# Patient Record
Sex: MALE | Race: WHITE | NOT HISPANIC OR LATINO | Employment: FULL TIME | URBAN - METROPOLITAN AREA
[De-identification: names, ages, dates, MRNs, and addresses within clinical notes are randomized per-mention and may not be internally consistent; named-entity substitution may affect disease eponyms.]

---

## 2018-07-02 ENCOUNTER — OFFICE VISIT (OUTPATIENT)
Dept: CARDIOLOGY | Facility: CLINIC | Age: 63
End: 2018-07-02
Payer: COMMERCIAL

## 2018-07-02 ENCOUNTER — CLINICAL SUPPORT (OUTPATIENT)
Dept: CARDIOLOGY | Facility: CLINIC | Age: 63
End: 2018-07-02
Payer: COMMERCIAL

## 2018-07-02 VITALS
WEIGHT: 218 LBS | HEART RATE: 72 BPM | SYSTOLIC BLOOD PRESSURE: 140 MMHG | BODY MASS INDEX: 34.21 KG/M2 | DIASTOLIC BLOOD PRESSURE: 86 MMHG | HEIGHT: 67 IN

## 2018-07-02 DIAGNOSIS — I25.10 CORONARY ARTERY DISEASE, ANGINA PRESENCE UNSPECIFIED, UNSPECIFIED VESSEL OR LESION TYPE, UNSPECIFIED WHETHER NATIVE OR TRANSPLANTED HEART: ICD-10-CM

## 2018-07-02 DIAGNOSIS — E78.5 HYPERLIPIDEMIA, UNSPECIFIED HYPERLIPIDEMIA TYPE: ICD-10-CM

## 2018-07-02 DIAGNOSIS — Z76.89 ENCOUNTER TO ESTABLISH CARE: Primary | ICD-10-CM

## 2018-07-02 DIAGNOSIS — R94.39 ABNORMAL STRESS TEST: Primary | ICD-10-CM

## 2018-07-02 DIAGNOSIS — Z91.89 AT RISK FOR CORONARY ARTERY DISEASE: ICD-10-CM

## 2018-07-02 DIAGNOSIS — I10 HYPERTENSION, UNSPECIFIED TYPE: ICD-10-CM

## 2018-07-02 DIAGNOSIS — Z76.89 ENCOUNTER TO ESTABLISH CARE: ICD-10-CM

## 2018-07-02 PROCEDURE — 99205 OFFICE O/P NEW HI 60 MIN: CPT | Mod: S$GLB,,, | Performed by: INTERNAL MEDICINE

## 2018-07-02 PROCEDURE — 3008F BODY MASS INDEX DOCD: CPT | Mod: CPTII,S$GLB,, | Performed by: INTERNAL MEDICINE

## 2018-07-02 PROCEDURE — 99999 PR PBB SHADOW E&M-NEW PATIENT-LVL III: CPT | Mod: PBBFAC,,, | Performed by: INTERNAL MEDICINE

## 2018-07-02 RX ORDER — ASPIRIN 81 MG/1
81 TABLET ORAL DAILY
COMMUNITY

## 2018-07-02 RX ORDER — ROSUVASTATIN CALCIUM 20 MG/1
20 TABLET, COATED ORAL NIGHTLY
COMMUNITY
End: 2020-06-03 | Stop reason: SDUPTHER

## 2018-07-02 RX ORDER — METOPROLOL SUCCINATE 25 MG/1
25 TABLET, EXTENDED RELEASE ORAL DAILY
Qty: 30 TABLET | Refills: 11 | Status: SHIPPED | OUTPATIENT
Start: 2018-07-02 | End: 2018-07-31 | Stop reason: ALTCHOICE

## 2018-07-02 RX ORDER — ERGOCALCIFEROL (VITAMIN D2) 200 MCG/ML
DROPS ORAL DAILY
COMMUNITY
End: 2021-06-10

## 2018-07-02 RX ORDER — RAMIPRIL 10 MG/1
10 CAPSULE ORAL DAILY
COMMUNITY
End: 2018-09-19 | Stop reason: DRUGHIGH

## 2018-07-02 NOTE — LETTER
July 2, 2018      Marin Mayes MD  [id number] [place] [address] [address] [place] [address]           [facility]  [address] [place] [address]  Phone: [phone]  Fax: [phone]          Patient: Jose Juan Magana   MR Number: [id number]   YOB: 1955   Date of Visit: 7/2/2018       Dear Dr. Marin Mayes:    Thank you for referring Jose Juan Magana to me for evaluation. Attached you will find relevant portions of my assessment and plan of care.    If you have questions, please do not hesitate to call me. I look forward to following Jose Juan Magana along with you.    Sincerely,    Raymond Yanes MD    Enclosure  CC:  No Recipients    If you would like to receive this communication electronically, please contact [email or web address] or [phone] to request more information on [facility] access.    For providers and/or their staff who would like to refer a patient to [facility], please contact us through our one-stop-shop provider referral line, [facility], at [phone].    If you feel you have received this communication in error or would no longer like to receive these types of communications, please e-mail [email or web address]

## 2018-07-02 NOTE — PROGRESS NOTES
Subjective:   Patient ID:  Jose Juan Magana is a 62 y.o. male who presents for evaluation of Coronary Artery Disease      HPI  A 61 yo male with h/o cad s/p lhc for abnormal ett w/o any symptoms had 3 mm migue depression with exercise 12 minutes nancy protocol. The lhc showed he has 80% lad disease 90% diagonal lcx om 90% and rca non obs disease. He has  Normal lvf. He has been on medical therapy. He is very active physically walks exercises lifts weight and has no anginal symptoms. He does elliptical  Walks  regularily hikes  No symptoms.he is on statins on asa  . He has no b blockers.  Past Medical History:   Diagnosis Date    Coronary artery disease     Heart murmur     Hyperlipidemia     Hypertension        Past Surgical History:   Procedure Laterality Date    CARDIAC CATHETERIZATION         Social History   Substance Use Topics    Smoking status: Never Smoker    Smokeless tobacco: Never Used    Alcohol use Yes      Comment: occasional       Family History   Problem Relation Age of Onset    Heart attack Brother 59    Heart attack Maternal Grandfather 65    Heart attack Paternal Grandfather 45    Hypertension Brother     Stroke Neg Hx        Current Outpatient Prescriptions   Medication Sig    aspirin (ECOTRIN) 81 MG EC tablet Take 81 mg by mouth once daily.    ergocalciferol (DRISDOL) 8,000 unit/mL Drop Take by mouth once daily.    ramipril (ALTACE) 10 MG capsule Take 10 mg by mouth once daily.    rosuvastatin (CRESTOR) 20 MG tablet Take 20 mg by mouth every evening.     No current facility-administered medications for this visit.      No current outpatient prescriptions on file prior to visit.     No current facility-administered medications on file prior to visit.        Review of patient's allergies indicates:  No Known Allergies    Review of Systems   Constitution: Negative for weakness and malaise/fatigue.   Eyes: Negative for blurred vision.   Cardiovascular: Negative for chest pain,  claudication, cyanosis, dyspnea on exertion, irregular heartbeat, leg swelling, near-syncope, orthopnea, palpitations and paroxysmal nocturnal dyspnea.   Respiratory: Negative for cough, hemoptysis and shortness of breath.    Hematologic/Lymphatic: Negative for bleeding problem. Does not bruise/bleed easily.   Skin: Negative for dry skin and itching.   Musculoskeletal: Negative for falls, muscle weakness and myalgias.   Gastrointestinal: Negative for abdominal pain, diarrhea, heartburn, hematemesis, hematochezia and melena.   Genitourinary: Negative for flank pain and hematuria.   Neurological: Negative for dizziness, focal weakness, headaches, light-headedness, numbness, paresthesias and seizures.   Psychiatric/Behavioral: Negative for altered mental status and memory loss. The patient is not nervous/anxious.    Allergic/Immunologic: Negative for hives.       Objective:   Physical Exam   Constitutional: He is oriented to person, place, and time. He appears well-developed and well-nourished. No distress.   HENT:   Head: Normocephalic and atraumatic.   Eyes: EOM are normal. Pupils are equal, round, and reactive to light. Right eye exhibits no discharge. Left eye exhibits no discharge.   Neck: Neck supple. No JVD present. No thyromegaly present.   Cardiovascular: Normal rate, regular rhythm, normal heart sounds and intact distal pulses.  Exam reveals no gallop and no friction rub.    No murmur heard.  Pulses:       Carotid pulses are 2+ on the right side, and 2+ on the left side.       Radial pulses are 2+ on the right side, and 2+ on the left side.        Femoral pulses are 2+ on the right side, and 2+ on the left side.       Popliteal pulses are 2+ on the right side, and 2+ on the left side.        Dorsalis pedis pulses are 2+ on the right side, and 2+ on the left side.        Posterior tibial pulses are 2+ on the right side, and 2+ on the left side.   Pulmonary/Chest: Effort normal and breath sounds normal. No  "respiratory distress. He has no wheezes. He has no rales. He exhibits no tenderness.   Abdominal: Soft. Bowel sounds are normal. He exhibits no distension. There is no tenderness.   Obese abdomen   Musculoskeletal: Normal range of motion. He exhibits no edema.   Neurological: He is alert and oriented to person, place, and time. No cranial nerve deficit.   Skin: Skin is warm and dry. No rash noted. He is not diaphoretic. No erythema.   Psychiatric: He has a normal mood and affect. His behavior is normal.   Nursing note and vitals reviewed.    Vitals:    07/02/18 1311   BP: (!) 140/86   Pulse: 72   Weight: 98.9 kg (218 lb)   Height: 5' 7" (1.702 m)     No results found for: CHOL  No results found for: HDL  No results found for: LDLCALC  No results found for: TRIG  No results found for: CHOLHDL    Chemistry    No results found for: NA, K, CL, CO2, BUN, CREATININE, GLU No results found for: CALCIUM, ALKPHOS, AST, ALT, BILITOT, ESTGFRAFRICA, EGFRNONAA       No results found for: TSH  No results found for: INR, PROTIME  No results found for: WBC, HGB, HCT, MCV, PLT  BNP  @LABRCNTIP(BNP,BNPTRIAGEBLO)@  CrCl cannot be calculated (No order found.).  Assessment:     1. Abnormal stress test    2. Coronary artery disease, angina presence unspecified, unspecified vessel or lesion type, unspecified whether native or transplanted heart    3. Hyperlipidemia, unspecified hyperlipidemia type    4. Hypertension, unspecified type    I have reviewed all the studies he ahd done at [facility] the interview he ahd with DR DONALDSON as weLL as some of the limited images from his heart cath eh ahd on his phone. The patient basically has mid AND distal lad disease a SIGNIFICANT STENOSIS OF A DIAGONAL THAT IS LARGE AND BIFURCATING IN ADDITION TO A SIGNIFICANT STENOSIS OF A SMALL OM  TO MEDIUM SIZE. HIS RCA IS LARGE AND DOMINANT WITH NON OBS CAD AND HAS NORMAL LVF. HE DOES NOT HAVE A SURGICAL DISEASE. HIS LONGEVITY WILL NOT BE AFFECTED WITH ANY " SURGICAL OR PERCUTANEOUS INTERVENTION DUE TO LACK OF SIGNIFICANT LMCA AND PROXIMAL LAD DISEASE. HE NEEDS GOOD MEDICAL THERAPY WITH B BLOCKERS ACE INHIBITORS STATINS AND ASPIRIN AND ACCORDINGLY PERFORM A STRESS CARDIOLITE ON MEDICAL THERAPY TO ASSESS ISCHEMIC BURDEN THEN MAKE DECISION IF ANY FOCUSED LESION SPECIFIC REVASCULARIZATION IS NEEDED IN ABSENSE OF SYMPTOMS.   Plan:   ADD TOPROL XL 25 MG PO DAILY   REVIEW ACTUAL ANGIOS    STRESS CARDIOLITE WITH MEDICAL THERAPY ON BOARD TO ASSESS ISCHEMIC SYMPTOMS.   STATINS   ASA EC 81 MG PO DAILY.  F/U AFTER CARDIOLITE WAS DONE.,

## 2018-07-17 ENCOUNTER — TELEPHONE (OUTPATIENT)
Dept: CARDIOLOGY | Facility: CLINIC | Age: 63
End: 2018-07-17

## 2018-07-17 DIAGNOSIS — I25.10 CORONARY ARTERY DISEASE INVOLVING NATIVE CORONARY ARTERY OF NATIVE HEART, ANGINA PRESENCE UNSPECIFIED: Primary | ICD-10-CM

## 2018-07-17 DIAGNOSIS — I25.10 CAD (CORONARY ARTERY DISEASE): Primary | ICD-10-CM

## 2018-07-17 DIAGNOSIS — I25.10 CORONARY ARTERY DISEASE, ANGINA PRESENCE UNSPECIFIED, UNSPECIFIED VESSEL OR LESION TYPE, UNSPECIFIED WHETHER NATIVE OR TRANSPLANTED HEART: Primary | ICD-10-CM

## 2018-07-17 NOTE — TELEPHONE ENCOUNTER
Pt is to have a Nuclear Stress test tomorrow morning. There are no EKG's in the system for this pt. Please place an order for an EKG. I will have it scheduled before we begin the Nuclear Stress test in the morning.     Thank you.

## 2018-07-18 ENCOUNTER — HOSPITAL ENCOUNTER (OUTPATIENT)
Dept: RADIOLOGY | Facility: HOSPITAL | Age: 63
Discharge: HOME | End: 2018-07-18
Attending: INTERNAL MEDICINE
Payer: COMMERCIAL

## 2018-07-18 ENCOUNTER — CLINICAL SUPPORT (OUTPATIENT)
Dept: CARDIOLOGY | Facility: CLINIC | Age: 63
End: 2018-07-18
Payer: COMMERCIAL

## 2018-07-18 ENCOUNTER — CLINICAL SUPPORT (OUTPATIENT)
Dept: CARDIOLOGY | Facility: CLINIC | Age: 63
End: 2018-07-18
Attending: INTERNAL MEDICINE
Payer: COMMERCIAL

## 2018-07-18 DIAGNOSIS — I25.10 CORONARY ARTERY DISEASE, ANGINA PRESENCE UNSPECIFIED, UNSPECIFIED VESSEL OR LESION TYPE, UNSPECIFIED WHETHER NATIVE OR TRANSPLANTED HEART: ICD-10-CM

## 2018-07-18 DIAGNOSIS — I25.10 CAD (CORONARY ARTERY DISEASE): ICD-10-CM

## 2018-07-18 DIAGNOSIS — Z91.89 AT RISK FOR CORONARY ARTERY DISEASE: ICD-10-CM

## 2018-07-18 DIAGNOSIS — R94.39 ABNORMAL STRESS TEST: ICD-10-CM

## 2018-07-18 LAB — DIASTOLIC DYSFUNCTION: NO

## 2018-07-18 PROCEDURE — 78452 HT MUSCLE IMAGE SPECT MULT: CPT | Mod: 26,,, | Performed by: INTERNAL MEDICINE

## 2018-07-18 PROCEDURE — 78452 HT MUSCLE IMAGE SPECT MULT: CPT | Mod: TC,PO

## 2018-07-18 PROCEDURE — 93000 ELECTROCARDIOGRAM COMPLETE: CPT | Mod: S$GLB,,, | Performed by: INTERNAL MEDICINE

## 2018-07-18 PROCEDURE — 93015 CV STRESS TEST SUPVJ I&R: CPT | Mod: S$GLB,,, | Performed by: INTERNAL MEDICINE

## 2018-07-25 ENCOUNTER — OFFICE VISIT (OUTPATIENT)
Dept: CARDIOLOGY | Facility: CLINIC | Age: 63
End: 2018-07-25
Payer: COMMERCIAL

## 2018-07-25 VITALS
SYSTOLIC BLOOD PRESSURE: 106 MMHG | WEIGHT: 211.63 LBS | HEART RATE: 60 BPM | BODY MASS INDEX: 33.21 KG/M2 | HEIGHT: 67 IN | DIASTOLIC BLOOD PRESSURE: 62 MMHG

## 2018-07-25 DIAGNOSIS — I25.10 CORONARY ARTERY DISEASE, ANGINA PRESENCE UNSPECIFIED, UNSPECIFIED VESSEL OR LESION TYPE, UNSPECIFIED WHETHER NATIVE OR TRANSPLANTED HEART: Primary | ICD-10-CM

## 2018-07-25 DIAGNOSIS — I10 HYPERTENSION, UNSPECIFIED TYPE: ICD-10-CM

## 2018-07-25 DIAGNOSIS — E78.5 HYPERLIPIDEMIA, UNSPECIFIED HYPERLIPIDEMIA TYPE: ICD-10-CM

## 2018-07-25 DIAGNOSIS — R94.39 ABNORMAL STRESS TEST: ICD-10-CM

## 2018-07-25 PROCEDURE — 99999 PR PBB SHADOW E&M-EST. PATIENT-LVL III: CPT | Mod: PBBFAC,,, | Performed by: INTERNAL MEDICINE

## 2018-07-25 PROCEDURE — 99213 OFFICE O/P EST LOW 20 MIN: CPT | Mod: S$GLB,,, | Performed by: INTERNAL MEDICINE

## 2018-07-25 PROCEDURE — 3008F BODY MASS INDEX DOCD: CPT | Mod: CPTII,S$GLB,, | Performed by: INTERNAL MEDICINE

## 2018-07-25 NOTE — PROGRESS NOTES
Subjective:   Patient ID:  Chino Manzano is a 62 y.o. male who presents for follow up of Coronary Artery Disease      HPI  A 61 yo  Male with  cad hlp lad disease diagonal that is reviewed angio from [facility] was placed obn b blockers and hasd cardiolite done that was normal. nop ischemia induced by cardiolite./ no lv dilatation. He has ischemic st changes on ett at  Hr higher than 110bpm.   He ahs no angina shortness of breath he has some ed after taking toprol. He ahs concerns about taht. He understands his cad limitation for exercsie and rate control he will inquire about bystolic for ed. He has been educated about diet weight loss.  Past Medical History:   Diagnosis Date    Coronary artery disease     Heart murmur     Hyperlipidemia     Hypertension        Past Surgical History:   Procedure Laterality Date    CARDIAC CATHETERIZATION         Social History   Substance Use Topics    Smoking status: Never Smoker    Smokeless tobacco: Never Used    Alcohol use Yes      Comment: occasional       Family History   Problem Relation Age of Onset    Heart attack Brother 59    Heart attack Maternal Grandfather 65    Heart attack Paternal Grandfather 45    Hypertension Brother     Stroke Neg Hx        Current Outpatient Prescriptions   Medication Sig    aspirin (ECOTRIN) 81 MG EC tablet Take 81 mg by mouth once daily.    ergocalciferol (DRISDOL) 8,000 unit/mL Drop Take by mouth once daily.    metoprolol succinate (TOPROL-XL) 25 MG 24 hr tablet Take 1 tablet (25 mg total) by mouth once daily.    ramipril (ALTACE) 10 MG capsule Take 10 mg by mouth once daily.    rosuvastatin (CRESTOR) 20 MG tablet Take 20 mg by mouth every evening.     No current facility-administered medications for this visit.      Current Outpatient Prescriptions on File Prior to Visit   Medication Sig    aspirin (ECOTRIN) 81 MG EC tablet Take 81 mg by mouth once daily.    ergocalciferol (DRISDOL) 8,000 unit/mL Drop Take by mouth once  "daily.    metoprolol succinate (TOPROL-XL) 25 MG 24 hr tablet Take 1 tablet (25 mg total) by mouth once daily.    ramipril (ALTACE) 10 MG capsule Take 10 mg by mouth once daily.    rosuvastatin (CRESTOR) 20 MG tablet Take 20 mg by mouth every evening.     No current facility-administered medications on file prior to visit.      Review of patient's allergies indicates:  No Known Allergies  ROS  Unchanged from previous visit.  Objective:   Physical Exam  Vitals:    07/25/18 1426 07/25/18 1428   BP: 106/64 106/62   BP Location: Right arm Left arm   Pulse: 60    Weight: 96 kg (211 lb 10.3 oz)    Height: 5' 7" (1.702 m)      No results found for: CHOL  No results found for: HDL  No results found for: LDLCALC  No results found for: TRIG  No results found for: CHOLHDL    Chemistry    No results found for: NA, K, CL, CO2, BUN, CREATININE, GLU No results found for: CALCIUM, ALKPHOS, AST, ALT, BILITOT, ESTGFRAFRICA, EGFRNONAA       No results found for: TSH  No results found for: INR, PROTIME  No results found for: WBC, HGB, HCT, MCV, PLT  BMP  No results found for: NA, K, CL, CO2, BUN, CREATININE, CALCIUM, ANIONGAP, ESTGFRAFRICA, EGFRNONAA  CrCl cannot be calculated (No order found.).    Assessment:     1. Coronary artery disease, angina presence unspecified, unspecified vessel or lesion type, unspecified whether native or transplanted heart    2. Hyperlipidemia, unspecified hyperlipidemia type    3. Hypertension, unspecified type    4. Abnormal stress test      Has asymptomatic cad he needs medical therapy and statins high dose asa and b blockers and limit exercise to hr 110/min and under.  He also was evaluated by DR BOATENG THAT AGREES WITH CURRENT PLAN,.  Plan:   AS PER ABOVE.  REPEAT STRESS TEST IN 6 MONTHS TO 1 YEAR OR AWAIT SYMPTOMS.  Continue current therapy  Cardiac low salt diet.  Risk factor modification and excercise program./WEIGHT LOSS  F/u in 6 months with lipid cmp       "

## 2018-07-31 ENCOUNTER — TELEPHONE (OUTPATIENT)
Dept: CARDIOLOGY | Facility: CLINIC | Age: 63
End: 2018-07-31

## 2018-07-31 DIAGNOSIS — I10 ESSENTIAL HYPERTENSION: ICD-10-CM

## 2018-07-31 DIAGNOSIS — I25.10 CORONARY ARTERY DISEASE INVOLVING NATIVE CORONARY ARTERY OF NATIVE HEART, ANGINA PRESENCE UNSPECIFIED: Primary | ICD-10-CM

## 2018-07-31 RX ORDER — NEBIVOLOL 5 MG/1
5 TABLET ORAL NIGHTLY
COMMUNITY
End: 2018-07-31 | Stop reason: SDUPTHER

## 2018-07-31 RX ORDER — NEBIVOLOL 5 MG/1
5 TABLET ORAL NIGHTLY
Qty: 90 TABLET | Refills: 3 | Status: SHIPPED | OUTPATIENT
Start: 2018-07-31 | End: 2019-09-14 | Stop reason: SDUPTHER

## 2018-07-31 NOTE — TELEPHONE ENCOUNTER
----- Message from Eneida Galdamez sent at 7/31/2018  8:49 AM CDT -----  Contact: pt  He's calling in regards to Rx being called in to pharm (bystolic)   pls call pt back at [phone] (home)       [facility] - [address] [address]  Phone: [phone] Fax: [phone]

## 2018-07-31 NOTE — TELEPHONE ENCOUNTER
Patient returned call and he stated he wanted to try Bystolic as discussed with Dr. Yanes versus Metoprolol 25 mg daily.  Advised would check with Dr. Yanes    Attempted to reach patient and left voicemail with direct contact number.  Called [facility] and all 3 prescriptions have multiple refills available.

## 2018-09-18 ENCOUNTER — TELEPHONE (OUTPATIENT)
Dept: CARDIOLOGY | Facility: CLINIC | Age: 63
End: 2018-09-18

## 2018-09-18 NOTE — TELEPHONE ENCOUNTER
Returned call and left message for return call on direct call back number to discuss appointment tomorrow

## 2018-09-18 NOTE — TELEPHONE ENCOUNTER
Returned phone call to patient and stated he's down approximately 25 pounds now and is having episodes of lightheadedness, dizziness especially when he lowers his head with golf and would like to know if blood pressure medications may need to be adjusted.  He currently does not have a blood pressure machine to monitor.  Altace 10 mg daily and Bystolic 5 mg    Dr. Yanes- please advise

## 2018-09-18 NOTE — TELEPHONE ENCOUNTER
----- Message from Joselyn Antony sent at 9/18/2018  3:50 PM CDT -----  Contact: wife(Beatrice)[phone]  Would like to consult with nurse regarding patient being dizzy, please call back at [phone]. Thanks/ar

## 2018-09-19 ENCOUNTER — TELEPHONE (OUTPATIENT)
Dept: CARDIOLOGY | Facility: CLINIC | Age: 63
End: 2018-09-19

## 2018-09-19 ENCOUNTER — CLINICAL SUPPORT (OUTPATIENT)
Dept: CARDIOLOGY | Facility: CLINIC | Age: 63
End: 2018-09-19
Payer: COMMERCIAL

## 2018-09-19 DIAGNOSIS — I10 ESSENTIAL HYPERTENSION: Primary | ICD-10-CM

## 2018-09-19 RX ORDER — RAMIPRIL 5 MG/1
5 CAPSULE ORAL DAILY
Qty: 30 CAPSULE | Refills: 6 | Status: SHIPPED | OUTPATIENT
Start: 2018-09-19 | End: 2018-12-07 | Stop reason: SDUPTHER

## 2018-09-19 RX ORDER — RAMIPRIL 5 MG/1
5 CAPSULE ORAL DAILY
COMMUNITY
End: 2018-09-19 | Stop reason: SDUPTHER

## 2018-09-19 NOTE — TELEPHONE ENCOUNTER
Patient here in Cardiology for Blood Pressure check  Sitting 112/64 pulse 62  Standing 90/64 pulse 60  Supine 104/60 pulse 58  Sitting 110/60 pulse 60  Current medications Altace 10 mg along with Aspirin 81 mg in the morning and Bystolic 5 mg with Crestor 20 mg in the evening  Telephoned Dr. Yanes with today's b/p readings and advised patient to decrease Altace to 5 mg daily  Patient advised accordingly and new prescription called to [facility]

## 2018-09-19 NOTE — PROGRESS NOTES
Patient here in Cardiology for Blood Pressure check  Sitting 112/64 pulse 62  Standing 90/64 pulse 60  Supine 104/60 pulse 58  Sitting 110/60 pulse 60  Current medications Altace 10 mg along with Aspirin 81 mg in the morning and Bystolic 5 mg with Crestor 20 mg in the evening  Telephoned Dr. Yanes with today's b/p readings and advised patient to decrease Altace to 5 mg daily  Patient advised accordingly and new prescription called to [facility]

## 2018-09-19 NOTE — TELEPHONE ENCOUNTER
Returned phone call and scheduled Nurse visit for 1PM    ----- Message from Jyothi Johnson sent at 9/19/2018  9:00 AM CDT -----  Contact: Pt  Please give pt a call at ..[phone] (home) he is returning the nurse call regarding a visit he needed to have today.

## 2018-11-13 ENCOUNTER — TELEPHONE (OUTPATIENT)
Dept: CARDIOLOGY | Facility: CLINIC | Age: 63
End: 2018-11-13

## 2018-11-13 NOTE — TELEPHONE ENCOUNTER
"Patient called --states on 11/3/18 his eye became "blood shot red". States went to Ophthamologist on 11/5/18 and was told a blood vessel burst and may be due to taking Aspirin 81 mg daily. Patient states no other signs of bleeding noticed--patient has scheduled appointment with PEDRITO Del Cid on 11/14/18.  "

## 2018-11-14 ENCOUNTER — OFFICE VISIT (OUTPATIENT)
Dept: CARDIOLOGY | Facility: CLINIC | Age: 63
End: 2018-11-14
Payer: COMMERCIAL

## 2018-11-14 ENCOUNTER — CLINICAL SUPPORT (OUTPATIENT)
Dept: CARDIOLOGY | Facility: CLINIC | Age: 63
End: 2018-11-14
Payer: COMMERCIAL

## 2018-11-14 VITALS
WEIGHT: 194 LBS | SYSTOLIC BLOOD PRESSURE: 112 MMHG | DIASTOLIC BLOOD PRESSURE: 76 MMHG | HEIGHT: 67 IN | HEART RATE: 54 BPM | BODY MASS INDEX: 30.45 KG/M2

## 2018-11-14 DIAGNOSIS — I10 ESSENTIAL HYPERTENSION: ICD-10-CM

## 2018-11-14 DIAGNOSIS — I25.10 CORONARY ARTERY DISEASE INVOLVING NATIVE CORONARY ARTERY OF NATIVE HEART WITHOUT ANGINA PECTORIS: ICD-10-CM

## 2018-11-14 DIAGNOSIS — E78.5 HYPERLIPIDEMIA, UNSPECIFIED HYPERLIPIDEMIA TYPE: ICD-10-CM

## 2018-11-14 DIAGNOSIS — R00.1 BRADYCARDIA: Primary | ICD-10-CM

## 2018-11-14 DIAGNOSIS — R00.1 BRADYCARDIA: ICD-10-CM

## 2018-11-14 DIAGNOSIS — R94.39 ABNORMAL STRESS TEST: ICD-10-CM

## 2018-11-14 PROCEDURE — 93000 ELECTROCARDIOGRAM COMPLETE: CPT | Mod: S$GLB,,, | Performed by: NUCLEAR MEDICINE

## 2018-11-14 PROCEDURE — 99999 PR PBB SHADOW E&M-EST. PATIENT-LVL III: CPT | Mod: PBBFAC,,, | Performed by: PHYSICIAN ASSISTANT

## 2018-11-14 PROCEDURE — 3008F BODY MASS INDEX DOCD: CPT | Mod: CPTII,S$GLB,, | Performed by: PHYSICIAN ASSISTANT

## 2018-11-14 PROCEDURE — 99215 OFFICE O/P EST HI 40 MIN: CPT | Mod: S$GLB,,, | Performed by: PHYSICIAN ASSISTANT

## 2018-11-14 NOTE — PROGRESS NOTES
Subjective:    Patient ID:  Chino Manzano is a 62 y.o. male who presents for follow-up of Coronary Artery Disease; Hypertension; and Hyperlipidemia      HPI   Mr. Manzano is a 62 year old male patient with a PMHx of CAD (LAD and diagonal disease) and hyperlipidemia who presents today for follow-up. He returns today and complains of redness in his left eye. States occurred on 11/3/18 after pressure washing his house. Went to ophthalmologist who stated it will take time to resolve. Tried holding ASA on his own x 1 week without much improvement. Cardiac wise, seems stable. No chest pain, SOB, or anginal equivalent. No palpitations. Still complains of positional dizziness and lightheadedness, concerned his BP may be too low. BP excellent today in clinic. Patient reports compliance with his medications. Very active and plays golf multiple times per week.     Review of Systems   Constitution: Negative for chills, decreased appetite, fever, weakness and malaise/fatigue.   HENT: Negative for congestion, hoarse voice and sore throat.         Left eye redness   Eyes: Negative for blurred vision and discharge.   Cardiovascular: Negative for chest pain, claudication, cyanosis, dyspnea on exertion, irregular heartbeat, leg swelling, near-syncope, orthopnea, palpitations and paroxysmal nocturnal dyspnea.   Respiratory: Negative for cough, hemoptysis, shortness of breath, snoring, sputum production and wheezing.    Endocrine: Negative for cold intolerance and heat intolerance.   Hematologic/Lymphatic: Negative for bleeding problem. Does not bruise/bleed easily.   Skin: Negative for rash.   Musculoskeletal: Negative for arthritis, back pain, joint pain, joint swelling, muscle cramps, muscle weakness and myalgias.   Gastrointestinal: Negative for abdominal pain, constipation, diarrhea, heartburn, melena and nausea.   Genitourinary: Negative for hematuria.   Neurological: Positive for dizziness and light-headedness. Negative for  "focal weakness, headaches, loss of balance, numbness, paresthesias and seizures.   Psychiatric/Behavioral: Negative for memory loss. The patient does not have insomnia.    Allergic/Immunologic: Negative for hives.     /76 (BP Location: Left arm, Patient Position: Sitting, BP Method: Medium (Manual))   Pulse (!) 54   Ht 5' 7" (1.702 m)   Wt 88 kg (194 lb 0.1 oz)   BMI 30.39 kg/m²   Repeat /72    Sitting 110/72  Sittin/78  Standing 106/78  Objective:    Physical Exam   Constitutional: He is oriented to person, place, and time. He appears well-developed and well-nourished. No distress.   HENT:   Head: Normocephalic and atraumatic.   Eyes: Pupils are equal, round, and reactive to light. Right eye exhibits no discharge.   Left eye with subconjunctival hemorrhage   Neck: Neck supple. No JVD present. No thyromegaly present.   Cardiovascular: Regular rhythm, S1 normal, S2 normal, normal heart sounds and intact distal pulses. Bradycardia present.   No murmur heard.  Pulmonary/Chest: Effort normal and breath sounds normal. No respiratory distress. He has no wheezes. He has no rales.   Abdominal: Soft. He exhibits no distension. There is no tenderness. There is no rebound.   Musculoskeletal: He exhibits no edema.   Neurological: He is alert and oriented to person, place, and time.   Skin: Skin is warm and dry. He is not diaphoretic. No erythema.   Psychiatric: He has a normal mood and affect. His behavior is normal.   Nursing note and vitals reviewed.      Impression: NORMAL MYOCARDIAL PERFUSION  1. The perfusion scan is free of evidence for myocardial ischemia or injury.   2. Resting wall motion is physiologic.   3. Resting LV function is normal.   4. The ventricular volumes are normal at rest and stress.   5. The extracardiac distribution of radioactivity is normal.   6. There was no previous study available to compare.      Chemistry    No results found for: NA, K, CL, CO2, BUN, CREATININE, GLU No " results found for: CALCIUM, ALKPHOS, AST, ALT, BILITOT, ESTGFRAFRICA, EGFRNONAA     No results found for: CHOL  No results found for: HDL  No results found for: LDLCALC  No results found for: TRIG  No results found for: CHOLHDL            Assessment:       1. Coronary artery disease involving native coronary artery of native heart without angina pectoris    2. Abnormal stress test    3. Hyperlipidemia, unspecified hyperlipidemia type    4. Essential hypertension       Presents for f/u. Unclear cause of his subconjunctival hemorrhage. Seems stable CV wise. No angina or equivalent. Reports positional lightheadedness, such as when bending over. BP stable today. Advised to continue same meds and log BP and bring to f/u with Dr. Yanes in December. Questions regarding his previous stress test results and echo findings discussed in detail.  Plan:   -Keep appt for labs  -Continue same meds, log BP and bring to appt  -Follow-up with ophthalmology as needed  -Keep appt with Dr. Yanes in December    ---This was a detailed, hour long visit

## 2018-11-15 ENCOUNTER — TELEPHONE (OUTPATIENT)
Dept: CARDIOLOGY | Facility: CLINIC | Age: 63
End: 2018-11-15

## 2018-11-15 NOTE — TELEPHONE ENCOUNTER
Please phone patient. Ok to decrease Ramipril to 2.5 mg daily and assess response/see if lightheadedness, dizziness improves.    Log BP and bring to appt with Dr. Yanes in December    IF he needs new prescription, please send to me    Thanks

## 2018-11-15 NOTE — TELEPHONE ENCOUNTER
Called and informed pt of information, pt states he just received refill for 5 mg dosage and will not be able to get new dosage until December, pt asked if he can take Ramipril every other day.    After speaking with PEDRITO Del Cid pt was advise that he can not take Ramipril every other day it must be taken daily, pt was then advise to continue the 5 mg dosage. And in December when he see  if he feel that Ramipril needs to be adjusted then it can be done then. Pt was also told to keep BP log daily until that appointment day and come with the log to show      Pt verbalized understanding

## 2018-12-03 ENCOUNTER — LAB VISIT (OUTPATIENT)
Dept: LAB | Facility: HOSPITAL | Age: 63
End: 2018-12-03
Attending: INTERNAL MEDICINE
Payer: COMMERCIAL

## 2018-12-03 DIAGNOSIS — E78.5 HYPERLIPIDEMIA, UNSPECIFIED HYPERLIPIDEMIA TYPE: ICD-10-CM

## 2018-12-03 LAB
ALBUMIN SERPL BCP-MCNC: 3.8 G/DL
ALP SERPL-CCNC: 71 U/L
ALT SERPL W/O P-5'-P-CCNC: 24 U/L
ANION GAP SERPL CALC-SCNC: 7 MMOL/L
AST SERPL-CCNC: 25 U/L
BILIRUB SERPL-MCNC: 0.7 MG/DL
BUN SERPL-MCNC: 15 MG/DL
CALCIUM SERPL-MCNC: 9.5 MG/DL
CHLORIDE SERPL-SCNC: 105 MMOL/L
CHOLEST SERPL-MCNC: 97 MG/DL
CHOLEST/HDLC SERPL: 2.3 {RATIO}
CO2 SERPL-SCNC: 28 MMOL/L
CREAT SERPL-MCNC: 1 MG/DL
EST. GFR  (AFRICAN AMERICAN): >60 ML/MIN/1.73 M^2
EST. GFR  (NON AFRICAN AMERICAN): >60 ML/MIN/1.73 M^2
GLUCOSE SERPL-MCNC: 85 MG/DL
HDLC SERPL-MCNC: 42 MG/DL
HDLC SERPL: 43.3 %
LDLC SERPL CALC-MCNC: 46 MG/DL
NONHDLC SERPL-MCNC: 55 MG/DL
POTASSIUM SERPL-SCNC: 4.7 MMOL/L
PROT SERPL-MCNC: 6.9 G/DL
SODIUM SERPL-SCNC: 140 MMOL/L
TRIGL SERPL-MCNC: 45 MG/DL

## 2018-12-03 PROCEDURE — 80053 COMPREHEN METABOLIC PANEL: CPT

## 2018-12-03 PROCEDURE — 36415 COLL VENOUS BLD VENIPUNCTURE: CPT | Mod: PO

## 2018-12-03 PROCEDURE — 80061 LIPID PANEL: CPT

## 2018-12-07 ENCOUNTER — OFFICE VISIT (OUTPATIENT)
Dept: CARDIOLOGY | Facility: CLINIC | Age: 63
End: 2018-12-07
Payer: COMMERCIAL

## 2018-12-07 VITALS
DIASTOLIC BLOOD PRESSURE: 68 MMHG | BODY MASS INDEX: 30.56 KG/M2 | SYSTOLIC BLOOD PRESSURE: 102 MMHG | WEIGHT: 194.69 LBS | HEART RATE: 60 BPM | HEIGHT: 67 IN

## 2018-12-07 DIAGNOSIS — E78.5 HYPERLIPIDEMIA, UNSPECIFIED HYPERLIPIDEMIA TYPE: ICD-10-CM

## 2018-12-07 DIAGNOSIS — R94.39 ABNORMAL STRESS TEST: ICD-10-CM

## 2018-12-07 DIAGNOSIS — I25.10 CORONARY ARTERY DISEASE INVOLVING NATIVE CORONARY ARTERY OF NATIVE HEART WITHOUT ANGINA PECTORIS: Primary | ICD-10-CM

## 2018-12-07 DIAGNOSIS — I10 ESSENTIAL HYPERTENSION: ICD-10-CM

## 2018-12-07 PROCEDURE — 99999 PR PBB SHADOW E&M-EST. PATIENT-LVL III: CPT | Mod: PBBFAC,,, | Performed by: INTERNAL MEDICINE

## 2018-12-07 PROCEDURE — 3008F BODY MASS INDEX DOCD: CPT | Mod: CPTII,S$GLB,, | Performed by: INTERNAL MEDICINE

## 2018-12-07 PROCEDURE — 99214 OFFICE O/P EST MOD 30 MIN: CPT | Mod: S$GLB,,, | Performed by: INTERNAL MEDICINE

## 2018-12-07 RX ORDER — RAMIPRIL 2.5 MG/1
2.5 CAPSULE ORAL DAILY
Qty: 30 CAPSULE | Refills: 6 | Status: SHIPPED | OUTPATIENT
Start: 2018-12-07 | End: 2019-07-05 | Stop reason: SDUPTHER

## 2018-12-07 NOTE — PROGRESS NOTES
Subjective:   Patient ID:  Chino Manzano is a 62 y.o. male who presents for follow up of Hypertension (c/o dizziness)      HPI  A 63 yo male with cad asymptomatic with multivessel w/o any ischemia on stress test he has been compliant with diet he lost weight 32 lbs since June. Has no chest pain or shortness of breath he exercises regularily. He gets lightheaded when he bends over.he is not drinking enough water. He is spacing his meds ramipril in am bystolic in pm.  Past Medical History:   Diagnosis Date    Coronary artery disease     Heart murmur     Hyperlipidemia     Hypertension        Past Surgical History:   Procedure Laterality Date    CARDIAC CATHETERIZATION         Social History     Tobacco Use    Smoking status: Never Smoker    Smokeless tobacco: Never Used   Substance Use Topics    Alcohol use: Yes     Comment: occasional    Drug use: No       Family History   Problem Relation Age of Onset    Heart attack Brother 59    Heart attack Maternal Grandfather 65    Heart attack Paternal Grandfather 45    Hypertension Brother     Stroke Neg Hx        Current Outpatient Medications   Medication Sig    aspirin (ECOTRIN) 81 MG EC tablet Take 81 mg by mouth once daily.    ergocalciferol (DRISDOL) 8,000 unit/mL Drop Take by mouth once daily.    nebivolol (BYSTOLIC) 5 MG Tab Take 1 tablet (5 mg total) by mouth nightly.    ramipril (ALTACE) 5 MG capsule Take 1 capsule (5 mg total) by mouth once daily.    rosuvastatin (CRESTOR) 20 MG tablet Take 20 mg by mouth every evening.     No current facility-administered medications for this visit.      Current Outpatient Medications on File Prior to Visit   Medication Sig    aspirin (ECOTRIN) 81 MG EC tablet Take 81 mg by mouth once daily.    ergocalciferol (DRISDOL) 8,000 unit/mL Drop Take by mouth once daily.    nebivolol (BYSTOLIC) 5 MG Tab Take 1 tablet (5 mg total) by mouth nightly.    ramipril (ALTACE) 5 MG capsule Take 1 capsule (5 mg total) by  mouth once daily.    rosuvastatin (CRESTOR) 20 MG tablet Take 20 mg by mouth every evening.     No current facility-administered medications on file prior to visit.      Review of patient's allergies indicates:  No Known Allergies  Review of Systems   Constitution: Negative for weakness and malaise/fatigue.   Eyes: Negative for blurred vision.   Cardiovascular: Negative for chest pain, claudication, cyanosis, dyspnea on exertion, irregular heartbeat, leg swelling, near-syncope, orthopnea, palpitations and paroxysmal nocturnal dyspnea.   Respiratory: Negative for cough, hemoptysis and shortness of breath.    Hematologic/Lymphatic: Negative for bleeding problem. Does not bruise/bleed easily.   Skin: Negative for dry skin and itching.   Musculoskeletal: Negative for falls, muscle weakness and myalgias.   Gastrointestinal: Negative for abdominal pain, diarrhea, heartburn, hematemesis, hematochezia and melena.   Genitourinary: Negative for flank pain and hematuria.   Neurological: Negative for dizziness, focal weakness, headaches, light-headedness, numbness, paresthesias and seizures.   Psychiatric/Behavioral: Negative for altered mental status and memory loss. The patient is not nervous/anxious.    Allergic/Immunologic: Negative for hives.       Objective:   Physical Exam   Constitutional: He is oriented to person, place, and time. He appears well-developed and well-nourished. No distress.   HENT:   Head: Normocephalic and atraumatic.   Eyes: EOM are normal. Pupils are equal, round, and reactive to light. Right eye exhibits no discharge. Left eye exhibits no discharge.   Neck: Neck supple. No JVD present. No thyromegaly present.   Cardiovascular: Normal rate, regular rhythm, normal heart sounds and intact distal pulses. Exam reveals no gallop and no friction rub.   No murmur heard.  Pulmonary/Chest: Effort normal and breath sounds normal. No respiratory distress. He has no wheezes. He has no rales. He exhibits no  "tenderness.   Abdominal: Soft. Bowel sounds are normal. He exhibits no distension. There is no tenderness.   Musculoskeletal: Normal range of motion. He exhibits no edema.   Neurological: He is alert and oriented to person, place, and time. No cranial nerve deficit.   Skin: Skin is warm and dry. No rash noted. He is not diaphoretic. No erythema.   Psychiatric: He has a normal mood and affect. His behavior is normal.   Nursing note and vitals reviewed.    Vitals:    12/07/18 0822   BP: 102/68   Patient Position: Sitting   BP Method: Small (Manual)   Pulse: 60   Weight: 88.3 kg (194 lb 10.7 oz)   Height: 5' 7" (1.702 m)     Lab Results   Component Value Date    CHOL 97 (L) 12/03/2018     Lab Results   Component Value Date    HDL 42 12/03/2018     Lab Results   Component Value Date    LDLCALC 46.0 (L) 12/03/2018     Lab Results   Component Value Date    TRIG 45 12/03/2018     Lab Results   Component Value Date    CHOLHDL 43.3 12/03/2018       Chemistry        Component Value Date/Time     12/03/2018 0900    K 4.7 12/03/2018 0900     12/03/2018 0900    CO2 28 12/03/2018 0900    BUN 15 12/03/2018 0900    CREATININE 1.0 12/03/2018 0900    GLU 85 12/03/2018 0900        Component Value Date/Time    CALCIUM 9.5 12/03/2018 0900    ALKPHOS 71 12/03/2018 0900    AST 25 12/03/2018 0900    ALT 24 12/03/2018 0900    BILITOT 0.7 12/03/2018 0900    ESTGFRAFRICA >60.0 12/03/2018 0900    EGFRNONAA >60.0 12/03/2018 0900          No results found for: TSH  No results found for: INR, PROTIME  No results found for: WBC, HGB, HCT, MCV, PLT  BMP  Sodium   Date Value Ref Range Status   12/03/2018 140 136 - 145 mmol/L Final     Potassium   Date Value Ref Range Status   12/03/2018 4.7 3.5 - 5.1 mmol/L Final     Chloride   Date Value Ref Range Status   12/03/2018 105 95 - 110 mmol/L Final     CO2   Date Value Ref Range Status   12/03/2018 28 23 - 29 mmol/L Final     BUN, Bld   Date Value Ref Range Status   12/03/2018 15 8 - 23 mg/dL " Final     Creatinine   Date Value Ref Range Status   12/03/2018 1.0 0.5 - 1.4 mg/dL Final     Calcium   Date Value Ref Range Status   12/03/2018 9.5 8.7 - 10.5 mg/dL Final     Anion Gap   Date Value Ref Range Status   12/03/2018 7 (L) 8 - 16 mmol/L Final     eGFR if    Date Value Ref Range Status   12/03/2018 >60.0 >60 mL/min/1.73 m^2 Final     eGFR if non    Date Value Ref Range Status   12/03/2018 >60.0 >60 mL/min/1.73 m^2 Final     Comment:     Calculation used to obtain the estimated glomerular filtration  rate (eGFR) is the CKD-EPI equation.        Estimated Creatinine Clearance: 82.3 mL/min (based on SCr of 1 mg/dL).    Assessment:     1. Coronary artery disease involving native coronary artery of native heart without angina pectoris    2. Hyperlipidemia, unspecified hyperlipidemia type    3. Essential hypertension    4. Abnormal stress test      Stable clinically doing well has no angina his lipids are on target. No further cath   Plan:   Continue current therapy  Cardiac low salt diet.  Risk factor modification and excercise program./weight loss  F/u in 6 months with lipid cmp with stress cardiolite

## 2019-05-27 ENCOUNTER — CLINICAL SUPPORT (OUTPATIENT)
Dept: CARDIOLOGY | Facility: CLINIC | Age: 64
End: 2019-05-27
Attending: INTERNAL MEDICINE
Payer: COMMERCIAL

## 2019-05-27 ENCOUNTER — HOSPITAL ENCOUNTER (OUTPATIENT)
Dept: RADIOLOGY | Facility: HOSPITAL | Age: 64
Discharge: HOME | End: 2019-05-27
Attending: INTERNAL MEDICINE
Payer: COMMERCIAL

## 2019-05-27 DIAGNOSIS — I25.10 CORONARY ARTERY DISEASE INVOLVING NATIVE CORONARY ARTERY OF NATIVE HEART WITHOUT ANGINA PECTORIS: ICD-10-CM

## 2019-05-27 DIAGNOSIS — R94.39 ABNORMAL STRESS TEST: ICD-10-CM

## 2019-05-27 LAB — DIASTOLIC DYSFUNCTION: NO

## 2019-05-27 PROCEDURE — 93015 NM MULTI TREAD STRESS CARDIAC COMPONENT: ICD-10-PCS | Mod: S$GLB,,, | Performed by: NUCLEAR MEDICINE

## 2019-05-27 PROCEDURE — 93015 CV STRESS TEST SUPVJ I&R: CPT | Mod: S$GLB,,, | Performed by: NUCLEAR MEDICINE

## 2019-05-27 PROCEDURE — A9502 TC99M TETROFOSMIN: HCPCS

## 2019-05-27 PROCEDURE — 78452 NM MULTI TREAD STRESS CARDIAC COMPONENT: ICD-10-PCS | Mod: 26,,, | Performed by: NUCLEAR MEDICINE

## 2019-05-27 PROCEDURE — 78452 HT MUSCLE IMAGE SPECT MULT: CPT | Mod: 26,,, | Performed by: NUCLEAR MEDICINE

## 2019-05-29 ENCOUNTER — TELEPHONE (OUTPATIENT)
Dept: CARDIOLOGY | Facility: CLINIC | Age: 64
End: 2019-05-29

## 2019-06-06 ENCOUNTER — OFFICE VISIT (OUTPATIENT)
Dept: CARDIOLOGY | Facility: CLINIC | Age: 64
End: 2019-06-06
Payer: COMMERCIAL

## 2019-06-06 VITALS
WEIGHT: 203.38 LBS | HEART RATE: 56 BPM | HEIGHT: 67 IN | SYSTOLIC BLOOD PRESSURE: 110 MMHG | DIASTOLIC BLOOD PRESSURE: 70 MMHG | BODY MASS INDEX: 31.92 KG/M2

## 2019-06-06 DIAGNOSIS — E78.5 HYPERLIPIDEMIA, UNSPECIFIED HYPERLIPIDEMIA TYPE: ICD-10-CM

## 2019-06-06 DIAGNOSIS — I25.10 CORONARY ARTERY DISEASE INVOLVING NATIVE CORONARY ARTERY OF NATIVE HEART WITHOUT ANGINA PECTORIS: Primary | ICD-10-CM

## 2019-06-06 DIAGNOSIS — I10 ESSENTIAL HYPERTENSION: ICD-10-CM

## 2019-06-06 DIAGNOSIS — R94.39 ABNORMAL STRESS TEST: ICD-10-CM

## 2019-06-06 PROCEDURE — 99214 OFFICE O/P EST MOD 30 MIN: CPT | Mod: S$GLB,,, | Performed by: INTERNAL MEDICINE

## 2019-06-06 PROCEDURE — 99214 PR OFFICE/OUTPT VISIT, EST, LEVL IV, 30-39 MIN: ICD-10-PCS | Mod: S$GLB,,, | Performed by: INTERNAL MEDICINE

## 2019-06-06 PROCEDURE — 99999 PR PBB SHADOW E&M-EST. PATIENT-LVL III: ICD-10-PCS | Mod: PBBFAC,,, | Performed by: INTERNAL MEDICINE

## 2019-06-06 PROCEDURE — 3008F PR BODY MASS INDEX (BMI) DOCUMENTED: ICD-10-PCS | Mod: CPTII,S$GLB,, | Performed by: INTERNAL MEDICINE

## 2019-06-06 PROCEDURE — 3074F SYST BP LT 130 MM HG: CPT | Mod: CPTII,S$GLB,, | Performed by: INTERNAL MEDICINE

## 2019-06-06 PROCEDURE — 3074F PR MOST RECENT SYSTOLIC BLOOD PRESSURE < 130 MM HG: ICD-10-PCS | Mod: CPTII,S$GLB,, | Performed by: INTERNAL MEDICINE

## 2019-06-06 PROCEDURE — 3078F PR MOST RECENT DIASTOLIC BLOOD PRESSURE < 80 MM HG: ICD-10-PCS | Mod: CPTII,S$GLB,, | Performed by: INTERNAL MEDICINE

## 2019-06-06 PROCEDURE — 3078F DIAST BP <80 MM HG: CPT | Mod: CPTII,S$GLB,, | Performed by: INTERNAL MEDICINE

## 2019-06-06 PROCEDURE — 99999 PR PBB SHADOW E&M-EST. PATIENT-LVL III: CPT | Mod: PBBFAC,,, | Performed by: INTERNAL MEDICINE

## 2019-06-06 PROCEDURE — 3008F BODY MASS INDEX DOCD: CPT | Mod: CPTII,S$GLB,, | Performed by: INTERNAL MEDICINE

## 2019-06-06 NOTE — PROGRESS NOTES
Subjective:   Patient ID:  Chino Manzano is a 63 y.o. male who presents for follow up of Hypertension (6 mth f/u)      HPI  A 62 yo male with cad asymptomatic very good exercise tolerance negative cardiolite is here for f/u. He is exercising doing well plays [employer] walks he gets dizzy when he leans forward and tries to get up. Ha s no other issues clinically he is compliant with meds. Gained few pounds over the past months.   Past Medical History:   Diagnosis Date    Coronary artery disease     Heart murmur     Hyperlipidemia     Hypertension        Past Surgical History:   Procedure Laterality Date    CARDIAC CATHETERIZATION         Social History     Tobacco Use    Smoking status: Never Smoker    Smokeless tobacco: Never Used   Substance Use Topics    Alcohol use: Yes     Comment: occasional    Drug use: No       Family History   Problem Relation Age of Onset    Heart attack Brother 59    Heart attack Maternal Grandfather 65    Heart attack Paternal Grandfather 45    Hypertension Brother     Stroke Neg Hx        Current Outpatient Medications   Medication Sig    aspirin (ECOTRIN) 81 MG EC tablet Take 81 mg by mouth once daily.    ergocalciferol (DRISDOL) 8,000 unit/mL Drop Take by mouth once daily.    nebivolol (BYSTOLIC) 5 MG Tab Take 1 tablet (5 mg total) by mouth nightly.    ramipril (ALTACE) 2.5 MG capsule Take 1 capsule (2.5 mg total) by mouth once daily.    rosuvastatin (CRESTOR) 20 MG tablet Take 20 mg by mouth every evening.     No current facility-administered medications for this visit.      Current Outpatient Medications on File Prior to Visit   Medication Sig    aspirin (ECOTRIN) 81 MG EC tablet Take 81 mg by mouth once daily.    ergocalciferol (DRISDOL) 8,000 unit/mL Drop Take by mouth once daily.    nebivolol (BYSTOLIC) 5 MG Tab Take 1 tablet (5 mg total) by mouth nightly.    ramipril (ALTACE) 2.5 MG capsule Take 1 capsule (2.5 mg total) by mouth once daily.    rosuvastatin  (CRESTOR) 20 MG tablet Take 20 mg by mouth every evening.     No current facility-administered medications on file prior to visit.      Review of patient's allergies indicates:  No Known Allergies  Review of Systems   Constitution: Negative for malaise/fatigue.   Eyes: Negative for blurred vision.   Cardiovascular: Negative for chest pain, claudication, cyanosis, dyspnea on exertion, irregular heartbeat, leg swelling, near-syncope, orthopnea, palpitations and paroxysmal nocturnal dyspnea.   Respiratory: Negative for cough, hemoptysis and shortness of breath.    Hematologic/Lymphatic: Negative for bleeding problem. Does not bruise/bleed easily.   Skin: Negative for dry skin and itching.   Musculoskeletal: Negative for falls, muscle weakness and myalgias.   Gastrointestinal: Negative for abdominal pain, diarrhea, heartburn, hematemesis, hematochezia and melena.   Genitourinary: Negative for flank pain and hematuria.   Neurological: Positive for light-headedness. Negative for dizziness, focal weakness, headaches, numbness, paresthesias, seizures and weakness.   Psychiatric/Behavioral: Negative for altered mental status and memory loss. The patient is not nervous/anxious.    Allergic/Immunologic: Negative for hives.       Objective:   Physical Exam   Constitutional: He is oriented to person, place, and time. He appears well-developed and well-nourished. No distress.   HENT:   Head: Normocephalic and atraumatic.   Eyes: Pupils are equal, round, and reactive to light. EOM are normal. Right eye exhibits no discharge. Left eye exhibits no discharge.   Neck: Neck supple. No JVD present. No thyromegaly present.   Cardiovascular: Normal rate, regular rhythm, normal heart sounds and intact distal pulses. Exam reveals no gallop and no friction rub.   No murmur heard.  Pulmonary/Chest: Effort normal and breath sounds normal. No respiratory distress. He has no wheezes. He has no rales. He exhibits no tenderness.   Abdominal: Soft.  "Bowel sounds are normal. He exhibits no distension. There is no tenderness.   Musculoskeletal: Normal range of motion. He exhibits no edema.   Neurological: He is alert and oriented to person, place, and time. No cranial nerve deficit.   Skin: Skin is warm and dry. No rash noted. He is not diaphoretic. No erythema.   Psychiatric: He has a normal mood and affect. His behavior is normal.   Nursing note and vitals reviewed.    Vitals:    06/06/19 1000   BP: 110/70   Patient Position: Sitting   BP Method: Large (Manual)   Pulse: (!) 56   Weight: 92.3 kg (203 lb 6 oz)   Height: 5' 7" (1.702 m)     Lab Results   Component Value Date    CHOL 99 (L) 05/27/2019    CHOL 97 (L) 12/03/2018     Lab Results   Component Value Date    HDL 39 (L) 05/27/2019    HDL 42 12/03/2018     Lab Results   Component Value Date    LDLCALC 47.2 (L) 05/27/2019    LDLCALC 46.0 (L) 12/03/2018     Lab Results   Component Value Date    TRIG 64 05/27/2019    TRIG 45 12/03/2018     Lab Results   Component Value Date    CHOLHDL 39.4 05/27/2019    CHOLHDL 43.3 12/03/2018       Chemistry        Component Value Date/Time     05/27/2019 0849    K 4.5 05/27/2019 0849     05/27/2019 0849    CO2 23 05/27/2019 0849    BUN 15 05/27/2019 0849    CREATININE 1.0 05/27/2019 0849    GLU 84 05/27/2019 0849        Component Value Date/Time    CALCIUM 9.8 05/27/2019 0849    ALKPHOS 63 05/27/2019 0849    AST 27 05/27/2019 0849    ALT 29 05/27/2019 0849    BILITOT 0.9 05/27/2019 0849    ESTGFRAFRICA >60.0 05/27/2019 0849    EGFRNONAA >60.0 05/27/2019 0849          No results found for: TSH  No results found for: INR, PROTIME  No results found for: WBC, HGB, HCT, MCV, PLT  BMP  Sodium   Date Value Ref Range Status   05/27/2019 140 136 - 145 mmol/L Final     Potassium   Date Value Ref Range Status   05/27/2019 4.5 3.5 - 5.1 mmol/L Final     Chloride   Date Value Ref Range Status   05/27/2019 109 95 - 110 mmol/L Final     CO2   Date Value Ref Range Status "   05/27/2019 23 23 - 29 mmol/L Final     BUN, Bld   Date Value Ref Range Status   05/27/2019 15 8 - 23 mg/dL Final     Creatinine   Date Value Ref Range Status   05/27/2019 1.0 0.5 - 1.4 mg/dL Final     Calcium   Date Value Ref Range Status   05/27/2019 9.8 8.7 - 10.5 mg/dL Final     Anion Gap   Date Value Ref Range Status   05/27/2019 8 8 - 16 mmol/L Final     eGFR if    Date Value Ref Range Status   05/27/2019 >60.0 >60 mL/min/1.73 m^2 Final     eGFR if non    Date Value Ref Range Status   05/27/2019 >60.0 >60 mL/min/1.73 m^2 Final     Comment:     Calculation used to obtain the estimated glomerular filtration  rate (eGFR) is the CKD-EPI equation.        CrCl cannot be calculated (Patient's most recent lab result is older than the maximum 7 days allowed.).    Assessment:     1. Coronary artery disease involving native coronary artery of native heart without angina pectoris    2. Hyperlipidemia, unspecified hyperlipidemia type    3. Essential hypertension    4. Abnormal stress test      Doing well clinically asymptomatic no angina negative stress lipids on target.  Plan:   Continue current therapy  Cardiac low salt diet.  Risk factor modification and excercise program.  F/u in 6 months with lipid cmp        Declines

## 2019-07-05 DIAGNOSIS — I10 ESSENTIAL HYPERTENSION: ICD-10-CM

## 2019-07-05 RX ORDER — RAMIPRIL 2.5 MG/1
CAPSULE ORAL
Qty: 30 CAPSULE | Refills: 0 | Status: SHIPPED | OUTPATIENT
Start: 2019-07-05 | End: 2019-10-21 | Stop reason: SDUPTHER

## 2019-09-14 DIAGNOSIS — I10 ESSENTIAL HYPERTENSION: ICD-10-CM

## 2019-09-14 DIAGNOSIS — I25.10 CORONARY ARTERY DISEASE INVOLVING NATIVE CORONARY ARTERY OF NATIVE HEART, ANGINA PRESENCE UNSPECIFIED: ICD-10-CM

## 2019-09-14 RX ORDER — NEBIVOLOL HYDROCHLORIDE 5 MG/1
TABLET ORAL
Qty: 90 TABLET | Refills: 0 | Status: SHIPPED | OUTPATIENT
Start: 2019-09-14 | End: 2019-12-16 | Stop reason: SDUPTHER

## 2019-10-21 DIAGNOSIS — I10 ESSENTIAL HYPERTENSION: ICD-10-CM

## 2019-10-23 RX ORDER — RAMIPRIL 2.5 MG/1
CAPSULE ORAL
Qty: 30 CAPSULE | Refills: 0 | Status: SHIPPED | OUTPATIENT
Start: 2019-10-23 | End: 2019-12-06 | Stop reason: SDUPTHER

## 2019-11-25 ENCOUNTER — LAB VISIT (OUTPATIENT)
Dept: LAB | Facility: HOSPITAL | Age: 64
End: 2019-11-25
Attending: INTERNAL MEDICINE
Payer: COMMERCIAL

## 2019-11-25 DIAGNOSIS — I25.10 CORONARY ARTERY DISEASE INVOLVING NATIVE CORONARY ARTERY OF NATIVE HEART WITHOUT ANGINA PECTORIS: ICD-10-CM

## 2019-11-25 LAB
ALBUMIN SERPL BCP-MCNC: 4.1 G/DL (ref 3.5–5.2)
ALP SERPL-CCNC: 70 U/L (ref 55–135)
ALT SERPL W/O P-5'-P-CCNC: 29 U/L (ref 10–44)
ANION GAP SERPL CALC-SCNC: 5 MMOL/L (ref 8–16)
AST SERPL-CCNC: 25 U/L (ref 10–40)
BILIRUB SERPL-MCNC: 0.7 MG/DL (ref 0.1–1)
BUN SERPL-MCNC: 17 MG/DL (ref 8–23)
CALCIUM SERPL-MCNC: 9.4 MG/DL (ref 8.7–10.5)
CHLORIDE SERPL-SCNC: 108 MMOL/L (ref 95–110)
CHOLEST SERPL-MCNC: 109 MG/DL (ref 120–199)
CHOLEST/HDLC SERPL: 2.4 {RATIO} (ref 2–5)
CO2 SERPL-SCNC: 28 MMOL/L (ref 23–29)
CREAT SERPL-MCNC: 1 MG/DL (ref 0.5–1.4)
EST. GFR  (AFRICAN AMERICAN): >60 ML/MIN/1.73 M^2
EST. GFR  (NON AFRICAN AMERICAN): >60 ML/MIN/1.73 M^2
GLUCOSE SERPL-MCNC: 99 MG/DL (ref 70–110)
HDLC SERPL-MCNC: 45 MG/DL (ref 40–75)
HDLC SERPL: 41.3 % (ref 20–50)
LDLC SERPL CALC-MCNC: 54.2 MG/DL (ref 63–159)
NONHDLC SERPL-MCNC: 64 MG/DL
POTASSIUM SERPL-SCNC: 4.5 MMOL/L (ref 3.5–5.1)
PROT SERPL-MCNC: 7 G/DL (ref 6–8.4)
SODIUM SERPL-SCNC: 141 MMOL/L (ref 136–145)
TRIGL SERPL-MCNC: 49 MG/DL (ref 30–150)

## 2019-11-25 PROCEDURE — 80053 COMPREHEN METABOLIC PANEL: CPT

## 2019-11-25 PROCEDURE — 80061 LIPID PANEL: CPT

## 2019-11-25 PROCEDURE — 36415 COLL VENOUS BLD VENIPUNCTURE: CPT | Mod: PO

## 2019-12-02 DIAGNOSIS — I10 ESSENTIAL HYPERTENSION: Primary | ICD-10-CM

## 2019-12-06 ENCOUNTER — OFFICE VISIT (OUTPATIENT)
Dept: CARDIOLOGY | Facility: CLINIC | Age: 64
End: 2019-12-06
Payer: COMMERCIAL

## 2019-12-06 VITALS
WEIGHT: 206.56 LBS | BODY MASS INDEX: 32.35 KG/M2 | SYSTOLIC BLOOD PRESSURE: 116 MMHG | DIASTOLIC BLOOD PRESSURE: 80 MMHG | HEART RATE: 51 BPM

## 2019-12-06 DIAGNOSIS — R94.39 ABNORMAL STRESS TEST: ICD-10-CM

## 2019-12-06 DIAGNOSIS — I10 ESSENTIAL HYPERTENSION: ICD-10-CM

## 2019-12-06 DIAGNOSIS — E78.5 HYPERLIPIDEMIA, UNSPECIFIED HYPERLIPIDEMIA TYPE: ICD-10-CM

## 2019-12-06 DIAGNOSIS — I25.10 CORONARY ARTERY DISEASE INVOLVING NATIVE CORONARY ARTERY OF NATIVE HEART WITHOUT ANGINA PECTORIS: Primary | ICD-10-CM

## 2019-12-06 PROCEDURE — 3074F PR MOST RECENT SYSTOLIC BLOOD PRESSURE < 130 MM HG: ICD-10-PCS | Mod: CPTII,S$GLB,, | Performed by: INTERNAL MEDICINE

## 2019-12-06 PROCEDURE — 99999 PR PBB SHADOW E&M-EST. PATIENT-LVL III: CPT | Mod: PBBFAC,,, | Performed by: INTERNAL MEDICINE

## 2019-12-06 PROCEDURE — 3079F DIAST BP 80-89 MM HG: CPT | Mod: CPTII,S$GLB,, | Performed by: INTERNAL MEDICINE

## 2019-12-06 PROCEDURE — 99214 PR OFFICE/OUTPT VISIT, EST, LEVL IV, 30-39 MIN: ICD-10-PCS | Mod: S$GLB,,, | Performed by: INTERNAL MEDICINE

## 2019-12-06 PROCEDURE — 3008F PR BODY MASS INDEX (BMI) DOCUMENTED: ICD-10-PCS | Mod: CPTII,S$GLB,, | Performed by: INTERNAL MEDICINE

## 2019-12-06 PROCEDURE — 99214 OFFICE O/P EST MOD 30 MIN: CPT | Mod: S$GLB,,, | Performed by: INTERNAL MEDICINE

## 2019-12-06 PROCEDURE — 3008F BODY MASS INDEX DOCD: CPT | Mod: CPTII,S$GLB,, | Performed by: INTERNAL MEDICINE

## 2019-12-06 PROCEDURE — 3079F PR MOST RECENT DIASTOLIC BLOOD PRESSURE 80-89 MM HG: ICD-10-PCS | Mod: CPTII,S$GLB,, | Performed by: INTERNAL MEDICINE

## 2019-12-06 PROCEDURE — 99999 PR PBB SHADOW E&M-EST. PATIENT-LVL III: ICD-10-PCS | Mod: PBBFAC,,, | Performed by: INTERNAL MEDICINE

## 2019-12-06 PROCEDURE — 3074F SYST BP LT 130 MM HG: CPT | Mod: CPTII,S$GLB,, | Performed by: INTERNAL MEDICINE

## 2019-12-06 RX ORDER — RAMIPRIL 2.5 MG/1
CAPSULE ORAL
Qty: 30 CAPSULE | Refills: 0 | Status: SHIPPED | OUTPATIENT
Start: 2019-12-06 | End: 2020-01-10

## 2019-12-06 NOTE — PROGRESS NOTES
Subjective:   Patient ID:  Chino Manzano is a 63 y.o. male who presents for follow up of No chief complaint on file.      HPI  A 64 YO MALE WITH CAD ABNORMAL CARDIOLITE HLP HTN IS HERE FOR F/U. HE IS PHYSICALLY ACTIVE HAS NO CHEST PAIN SHORTNESS OF BREATH. HE HAS BEEN WALKING PLAYING [employer] FOR EXERCISE. HE GETS LIGHTHEADED AT TIMES WHEN HE PUTS HIS RAMÓN IN THE GROUND. HAS NO EDEMA CHF TIA CLAUDICATION. HAS NO OTHER ISSUES CLINICALLY CARDIAC WISE.   Past Medical History:   Diagnosis Date    Coronary artery disease     Heart murmur     Hyperlipidemia     Hypertension        Past Surgical History:   Procedure Laterality Date    CARDIAC CATHETERIZATION         Social History     Tobacco Use    Smoking status: Never Smoker    Smokeless tobacco: Never Used   Substance Use Topics    Alcohol use: Yes     Comment: occasional    Drug use: No       Family History   Problem Relation Age of Onset    Heart attack Brother 59    Heart attack Maternal Grandfather 65    Heart attack Paternal Grandfather 45    Hypertension Brother     Stroke Neg Hx        Current Outpatient Medications   Medication Sig    aspirin (ECOTRIN) 81 MG EC tablet Take 81 mg by mouth once daily.    BYSTOLIC 5 mg Tab TAKE ONE TABLET BY MOUTH EVERY EVENING    ramipril (ALTACE) 2.5 MG capsule TAKE ONE CAPSULE EVERY DAY.    rosuvastatin (CRESTOR) 20 MG tablet Take 20 mg by mouth every evening.    ergocalciferol (DRISDOL) 8,000 unit/mL Drop Take by mouth once daily.     No current facility-administered medications for this visit.      Current Outpatient Medications on File Prior to Visit   Medication Sig    aspirin (ECOTRIN) 81 MG EC tablet Take 81 mg by mouth once daily.    BYSTOLIC 5 mg Tab TAKE ONE TABLET BY MOUTH EVERY EVENING    rosuvastatin (CRESTOR) 20 MG tablet Take 20 mg by mouth every evening.    ergocalciferol (DRISDOL) 8,000 unit/mL Drop Take by mouth once daily.    [DISCONTINUED] ramipril (ALTACE) 2.5 MG capsule TAKE ONE  CAPSULE EVERY DAY.     No current facility-administered medications on file prior to visit.      Review of patient's allergies indicates:  No Known Allergies  Review of Systems   Constitution: Negative for malaise/fatigue.   Eyes: Negative for blurred vision.   Cardiovascular: Negative for chest pain, claudication, cyanosis, dyspnea on exertion, irregular heartbeat, leg swelling, near-syncope, orthopnea, palpitations and paroxysmal nocturnal dyspnea.   Respiratory: Negative for cough, hemoptysis and shortness of breath.    Hematologic/Lymphatic: Negative for bleeding problem. Does not bruise/bleed easily.   Skin: Negative for dry skin and itching.   Musculoskeletal: Negative for falls, muscle weakness and myalgias.   Gastrointestinal: Negative for abdominal pain, diarrhea, heartburn, hematemesis, hematochezia and melena.   Genitourinary: Negative for flank pain and hematuria.   Neurological: Negative for dizziness, focal weakness, headaches, light-headedness, numbness, paresthesias, seizures and weakness.   Psychiatric/Behavioral: Negative for altered mental status and memory loss. The patient is not nervous/anxious.    Allergic/Immunologic: Negative for hives.       Objective:   Physical Exam   Constitutional: He is oriented to person, place, and time. He appears well-developed and well-nourished. No distress.   HENT:   Head: Normocephalic and atraumatic.   Eyes: Pupils are equal, round, and reactive to light. EOM are normal. Right eye exhibits no discharge. Left eye exhibits no discharge.   Neck: Neck supple. No JVD present. No thyromegaly present.   Cardiovascular: Normal rate, regular rhythm, normal heart sounds and intact distal pulses. Exam reveals no gallop and no friction rub.   No murmur heard.  Pulses:       Carotid pulses are 2+ on the right side, and 2+ on the left side.       Radial pulses are 2+ on the right side, and 2+ on the left side.        Femoral pulses are 2+ on the right side, and 2+ on the  left side.       Popliteal pulses are 2+ on the right side, and 2+ on the left side.        Dorsalis pedis pulses are 2+ on the right side, and 2+ on the left side.        Posterior tibial pulses are 2+ on the right side, and 2+ on the left side.   Pulmonary/Chest: Effort normal and breath sounds normal. No respiratory distress. He has no wheezes. He has no rales. He exhibits no tenderness.   Abdominal: Soft. Bowel sounds are normal. He exhibits no distension. There is no tenderness.   OBESE ABDOMEN.   Musculoskeletal: Normal range of motion. He exhibits no edema.   Neurological: He is alert and oriented to person, place, and time. No cranial nerve deficit.   Skin: Skin is warm and dry. No rash noted. He is not diaphoretic. No erythema.   Psychiatric: He has a normal mood and affect. His behavior is normal.   Nursing note and vitals reviewed.    Vitals:    12/06/19 1438 12/06/19 1442   BP: 124/80 116/80   BP Location: Right arm Left arm   Patient Position: Sitting Sitting   BP Method: Medium (Manual) Medium (Manual)   Pulse: (!) 51    Weight: 93.7 kg (206 lb 9.1 oz)      Lab Results   Component Value Date    CHOL 109 (L) 11/25/2019    CHOL 99 (L) 05/27/2019    CHOL 97 (L) 12/03/2018     Lab Results   Component Value Date    HDL 45 11/25/2019    HDL 39 (L) 05/27/2019    HDL 42 12/03/2018     Lab Results   Component Value Date    LDLCALC 54.2 (L) 11/25/2019    LDLCALC 47.2 (L) 05/27/2019    LDLCALC 46.0 (L) 12/03/2018     Lab Results   Component Value Date    TRIG 49 11/25/2019    TRIG 64 05/27/2019    TRIG 45 12/03/2018     Lab Results   Component Value Date    CHOLHDL 41.3 11/25/2019    CHOLHDL 39.4 05/27/2019    CHOLHDL 43.3 12/03/2018       Chemistry        Component Value Date/Time     11/25/2019 0807    K 4.5 11/25/2019 0807     11/25/2019 0807    CO2 28 11/25/2019 0807    BUN 17 11/25/2019 0807    CREATININE 1.0 11/25/2019 0807    GLU 99 11/25/2019 0807        Component Value Date/Time    CALCIUM  9.4 11/25/2019 0807    ALKPHOS 70 11/25/2019 0807    AST 25 11/25/2019 0807    ALT 29 11/25/2019 0807    BILITOT 0.7 11/25/2019 0807    ESTGFRAFRICA >60.0 11/25/2019 0807    EGFRNONAA >60.0 11/25/2019 0807          No results found for: TSH  No results found for: INR, PROTIME  No results found for: WBC, HGB, HCT, MCV, PLT  BMP  Sodium   Date Value Ref Range Status   11/25/2019 141 136 - 145 mmol/L Final     Potassium   Date Value Ref Range Status   11/25/2019 4.5 3.5 - 5.1 mmol/L Final     Chloride   Date Value Ref Range Status   11/25/2019 108 95 - 110 mmol/L Final     CO2   Date Value Ref Range Status   11/25/2019 28 23 - 29 mmol/L Final     BUN, Bld   Date Value Ref Range Status   11/25/2019 17 8 - 23 mg/dL Final     Creatinine   Date Value Ref Range Status   11/25/2019 1.0 0.5 - 1.4 mg/dL Final     Calcium   Date Value Ref Range Status   11/25/2019 9.4 8.7 - 10.5 mg/dL Final     Anion Gap   Date Value Ref Range Status   11/25/2019 5 (L) 8 - 16 mmol/L Final     eGFR if    Date Value Ref Range Status   11/25/2019 >60.0 >60 mL/min/1.73 m^2 Final     eGFR if non    Date Value Ref Range Status   11/25/2019 >60.0 >60 mL/min/1.73 m^2 Final     Comment:     Calculation used to obtain the estimated glomerular filtration  rate (eGFR) is the CKD-EPI equation.        CrCl cannot be calculated (Patient's most recent lab result is older than the maximum 7 days allowed.).    Assessment:     1. Coronary artery disease involving native coronary artery of native heart without angina pectoris    2. Hyperlipidemia, unspecified hyperlipidemia type    3. Essential hypertension    4. Abnormal stress test      ASYMPTOMATIC DOING WELL NO CARDIAC SYMPTOMS NEEDS EXERCISE WEIGHT LOSS.  Plan:     Continue current therapy  Cardiac low salt diet.  Risk factor modification and excercise program./WEIGHT LOSS  F/u in 6 months with lipid cmp

## 2019-12-16 ENCOUNTER — TELEPHONE (OUTPATIENT)
Dept: CARDIOLOGY | Facility: CLINIC | Age: 64
End: 2019-12-16

## 2019-12-16 DIAGNOSIS — I25.10 CORONARY ARTERY DISEASE INVOLVING NATIVE CORONARY ARTERY OF NATIVE HEART, ANGINA PRESENCE UNSPECIFIED: ICD-10-CM

## 2019-12-16 DIAGNOSIS — I10 ESSENTIAL HYPERTENSION: ICD-10-CM

## 2019-12-16 RX ORDER — NEBIVOLOL HYDROCHLORIDE 5 MG/1
TABLET ORAL
Qty: 90 TABLET | Refills: 6 | Status: SHIPPED | OUTPATIENT
Start: 2019-12-16 | End: 2021-01-04

## 2019-12-16 NOTE — TELEPHONE ENCOUNTER
Returned call to patient and advised Plain Claritin, Zyrtec, Mucinex or HBP formula are acceptable, avoid Sudafed products      ----- Message from Sesar Gordon sent at 12/16/2019 12:05 PM CST -----  Contact: Pt  Pt called in regards to speaking with the staff in regards to cold medication. Pt stated due to the new medication he did not know what he was able to take.Pt can be reached at  [phone] (work) or [phone] (home).

## 2020-01-10 DIAGNOSIS — I10 ESSENTIAL HYPERTENSION: ICD-10-CM

## 2020-01-10 RX ORDER — RAMIPRIL 2.5 MG/1
CAPSULE ORAL
Qty: 30 CAPSULE | Refills: 0 | Status: SHIPPED | OUTPATIENT
Start: 2020-01-10 | End: 2020-02-10

## 2020-02-10 DIAGNOSIS — I10 ESSENTIAL HYPERTENSION: ICD-10-CM

## 2020-02-10 RX ORDER — RAMIPRIL 2.5 MG/1
CAPSULE ORAL
Qty: 30 CAPSULE | Refills: 11 | Status: SHIPPED | OUTPATIENT
Start: 2020-02-10 | End: 2021-02-26

## 2020-05-25 ENCOUNTER — LAB VISIT (OUTPATIENT)
Dept: LAB | Facility: HOSPITAL | Age: 65
End: 2020-05-25
Attending: INTERNAL MEDICINE
Payer: COMMERCIAL

## 2020-05-25 DIAGNOSIS — E78.5 HYPERLIPIDEMIA, UNSPECIFIED HYPERLIPIDEMIA TYPE: ICD-10-CM

## 2020-05-25 DIAGNOSIS — I25.10 CORONARY ARTERY DISEASE INVOLVING NATIVE CORONARY ARTERY OF NATIVE HEART WITHOUT ANGINA PECTORIS: ICD-10-CM

## 2020-05-25 LAB
ALBUMIN SERPL BCP-MCNC: 4 G/DL (ref 3.5–5.2)
ALP SERPL-CCNC: 63 U/L (ref 55–135)
ALT SERPL W/O P-5'-P-CCNC: 25 U/L (ref 10–44)
ANION GAP SERPL CALC-SCNC: 6 MMOL/L (ref 8–16)
AST SERPL-CCNC: 22 U/L (ref 10–40)
BILIRUB SERPL-MCNC: 0.6 MG/DL (ref 0.1–1)
BUN SERPL-MCNC: 21 MG/DL (ref 8–23)
CALCIUM SERPL-MCNC: 9.1 MG/DL (ref 8.7–10.5)
CHLORIDE SERPL-SCNC: 110 MMOL/L (ref 95–110)
CHOLEST SERPL-MCNC: 128 MG/DL (ref 120–199)
CHOLEST/HDLC SERPL: 2.7 {RATIO} (ref 2–5)
CO2 SERPL-SCNC: 25 MMOL/L (ref 23–29)
CREAT SERPL-MCNC: 1.1 MG/DL (ref 0.5–1.4)
EST. GFR  (AFRICAN AMERICAN): >60 ML/MIN/1.73 M^2
EST. GFR  (NON AFRICAN AMERICAN): >60 ML/MIN/1.73 M^2
GLUCOSE SERPL-MCNC: 89 MG/DL (ref 70–110)
HDLC SERPL-MCNC: 47 MG/DL (ref 40–75)
HDLC SERPL: 36.7 % (ref 20–50)
LDLC SERPL CALC-MCNC: 68.2 MG/DL (ref 63–159)
NONHDLC SERPL-MCNC: 81 MG/DL
POTASSIUM SERPL-SCNC: 4.4 MMOL/L (ref 3.5–5.1)
PROT SERPL-MCNC: 7.1 G/DL (ref 6–8.4)
SODIUM SERPL-SCNC: 141 MMOL/L (ref 136–145)
TRIGL SERPL-MCNC: 64 MG/DL (ref 30–150)

## 2020-05-25 PROCEDURE — 36415 COLL VENOUS BLD VENIPUNCTURE: CPT | Mod: PO

## 2020-05-25 PROCEDURE — 80053 COMPREHEN METABOLIC PANEL: CPT

## 2020-05-25 PROCEDURE — 80061 LIPID PANEL: CPT

## 2020-06-03 RX ORDER — ROSUVASTATIN CALCIUM 20 MG/1
20 TABLET, COATED ORAL NIGHTLY
Qty: 30 TABLET | Refills: 11 | Status: SHIPPED | OUTPATIENT
Start: 2020-06-03 | End: 2021-08-19 | Stop reason: SDUPTHER

## 2020-06-12 ENCOUNTER — OFFICE VISIT (OUTPATIENT)
Dept: CARDIOLOGY | Facility: CLINIC | Age: 65
End: 2020-06-12
Payer: COMMERCIAL

## 2020-06-12 ENCOUNTER — HOSPITAL ENCOUNTER (OUTPATIENT)
Dept: CARDIOLOGY | Facility: HOSPITAL | Age: 65
Discharge: HOME | End: 2020-06-12
Attending: INTERNAL MEDICINE
Payer: COMMERCIAL

## 2020-06-12 VITALS
DIASTOLIC BLOOD PRESSURE: 64 MMHG | SYSTOLIC BLOOD PRESSURE: 118 MMHG | HEART RATE: 58 BPM | HEIGHT: 67 IN | WEIGHT: 205.25 LBS | BODY MASS INDEX: 32.21 KG/M2 | OXYGEN SATURATION: 94 %

## 2020-06-12 DIAGNOSIS — I10 ESSENTIAL HYPERTENSION: ICD-10-CM

## 2020-06-12 DIAGNOSIS — I25.10 CORONARY ARTERY DISEASE INVOLVING NATIVE CORONARY ARTERY OF NATIVE HEART WITHOUT ANGINA PECTORIS: Primary | ICD-10-CM

## 2020-06-12 DIAGNOSIS — E78.5 HYPERLIPIDEMIA, UNSPECIFIED HYPERLIPIDEMIA TYPE: ICD-10-CM

## 2020-06-12 DIAGNOSIS — R94.39 ABNORMAL STRESS TEST: ICD-10-CM

## 2020-06-12 PROCEDURE — 3008F PR BODY MASS INDEX (BMI) DOCUMENTED: ICD-10-PCS | Mod: CPTII,S$GLB,, | Performed by: INTERNAL MEDICINE

## 2020-06-12 PROCEDURE — 3074F SYST BP LT 130 MM HG: CPT | Mod: CPTII,S$GLB,, | Performed by: INTERNAL MEDICINE

## 2020-06-12 PROCEDURE — 3074F PR MOST RECENT SYSTOLIC BLOOD PRESSURE < 130 MM HG: ICD-10-PCS | Mod: CPTII,S$GLB,, | Performed by: INTERNAL MEDICINE

## 2020-06-12 PROCEDURE — 3008F BODY MASS INDEX DOCD: CPT | Mod: CPTII,S$GLB,, | Performed by: INTERNAL MEDICINE

## 2020-06-12 PROCEDURE — 99214 OFFICE O/P EST MOD 30 MIN: CPT | Mod: S$GLB,,, | Performed by: INTERNAL MEDICINE

## 2020-06-12 PROCEDURE — 99999 PR PBB SHADOW E&M-EST. PATIENT-LVL III: CPT | Mod: PBBFAC,,, | Performed by: INTERNAL MEDICINE

## 2020-06-12 PROCEDURE — 3078F DIAST BP <80 MM HG: CPT | Mod: CPTII,S$GLB,, | Performed by: INTERNAL MEDICINE

## 2020-06-12 PROCEDURE — 93010 ELECTROCARDIOGRAM REPORT: CPT | Mod: ,,, | Performed by: INTERNAL MEDICINE

## 2020-06-12 PROCEDURE — 93005 ELECTROCARDIOGRAM TRACING: CPT

## 2020-06-12 PROCEDURE — 99214 PR OFFICE/OUTPT VISIT, EST, LEVL IV, 30-39 MIN: ICD-10-PCS | Mod: S$GLB,,, | Performed by: INTERNAL MEDICINE

## 2020-06-12 PROCEDURE — 93010 EKG 12-LEAD: ICD-10-PCS | Mod: ,,, | Performed by: INTERNAL MEDICINE

## 2020-06-12 PROCEDURE — 3078F PR MOST RECENT DIASTOLIC BLOOD PRESSURE < 80 MM HG: ICD-10-PCS | Mod: CPTII,S$GLB,, | Performed by: INTERNAL MEDICINE

## 2020-06-12 PROCEDURE — 99999 PR PBB SHADOW E&M-EST. PATIENT-LVL III: ICD-10-PCS | Mod: PBBFAC,,, | Performed by: INTERNAL MEDICINE

## 2020-06-12 NOTE — PROGRESS NOTES
Subjective:   Patient ID:  Chino Manzano is a 64 y.o. male who presents for follow up of Coronary Artery Disease      HPI  A 65 Yo male with cad medically treated with negative cardiolite fairly active exercises regularily has no angina or shortness of breath tries to be compliant with diet weight unchanged.has still no cardiovascular complaints. No change in status since last visit in December.he is being more liberal with his evening snacks. Rasing his ldl.has normal ekg with pac. He pl;ays gulf asymptomatic cuts grass with push mower asymptomatic.  Past Medical History:   Diagnosis Date    Coronary artery disease     Heart murmur     Hypercholesterolemia     Hyperlipidemia     Hypertension     Insulin resistance syndrome     Mumps     childhood    MARCIA (obstructive sleep apnea)        Past Surgical History:   Procedure Laterality Date    CARDIAC CATHETERIZATION         Social History     Tobacco Use    Smoking status: Never Smoker    Smokeless tobacco: Never Used   Substance Use Topics    Alcohol use: Yes     Comment: occasional    Drug use: No       Family History   Problem Relation Age of Onset    Heart attack Brother 59    Heart attack Maternal Grandfather 65    Heart attack Paternal Grandfather 45    Hypertension Brother     Glaucoma Mother     Cancer Father     Stroke Neg Hx        Current Outpatient Medications   Medication Sig    aspirin (ECOTRIN) 81 MG EC tablet Take 81 mg by mouth once daily.    BYSTOLIC 5 mg Tab TAKE ONE TABLET BY MOUTH EVERY EVENING    ergocalciferol (DRISDOL) 8,000 unit/mL Drop Take by mouth once daily.    ramipril (ALTACE) 2.5 MG capsule TAKE ONE CAPSULE EVERY DAY.    rosuvastatin (CRESTOR) 20 MG tablet Take 1 tablet (20 mg total) by mouth every evening.    amoxicillin (AMOXIL) 875 MG tablet Take 1 tablet (875 mg total) by mouth every 12 (twelve) hours. (Patient not taking: Reported on 6/12/2020)     No current facility-administered medications for  this visit.      Current Outpatient Medications on File Prior to Visit   Medication Sig    aspirin (ECOTRIN) 81 MG EC tablet Take 81 mg by mouth once daily.    BYSTOLIC 5 mg Tab TAKE ONE TABLET BY MOUTH EVERY EVENING    ergocalciferol (DRISDOL) 8,000 unit/mL Drop Take by mouth once daily.    ramipril (ALTACE) 2.5 MG capsule TAKE ONE CAPSULE EVERY DAY.    rosuvastatin (CRESTOR) 20 MG tablet Take 1 tablet (20 mg total) by mouth every evening.    amoxicillin (AMOXIL) 875 MG tablet Take 1 tablet (875 mg total) by mouth every 12 (twelve) hours. (Patient not taking: Reported on 6/12/2020)     No current facility-administered medications on file prior to visit.      Review of patient's allergies indicates:  No Known Allergies  Review of Systems   Constitution: Negative for malaise/fatigue.   Eyes: Negative for blurred vision.   Cardiovascular: Negative for chest pain, claudication, cyanosis, dyspnea on exertion, irregular heartbeat, leg swelling, near-syncope, orthopnea, palpitations and paroxysmal nocturnal dyspnea.   Respiratory: Negative for cough, hemoptysis and shortness of breath.    Hematologic/Lymphatic: Negative for bleeding problem. Does not bruise/bleed easily.   Skin: Negative for dry skin and itching.   Musculoskeletal: Negative for falls, muscle weakness and myalgias.   Gastrointestinal: Negative for abdominal pain, diarrhea, heartburn, hematemesis, hematochezia and melena.   Genitourinary: Negative for flank pain and hematuria.   Neurological: Negative for dizziness, focal weakness, headaches, light-headedness, numbness, paresthesias, seizures and weakness.   Psychiatric/Behavioral: Negative for altered mental status and memory loss. The patient is not nervous/anxious.    Allergic/Immunologic: Negative for hives.       Objective:   Physical Exam   Constitutional: He is oriented to person, place, and time. He appears well-developed and well-nourished. No distress.   HENT:   Head: Normocephalic and  "atraumatic.   Eyes: Pupils are equal, round, and reactive to light. EOM are normal. Right eye exhibits no discharge. Left eye exhibits no discharge.   Neck: Neck supple. No JVD present. No thyromegaly present.   Cardiovascular: Normal rate, regular rhythm, normal heart sounds and intact distal pulses. Exam reveals no gallop and no friction rub.   No murmur heard.  Pulmonary/Chest: Effort normal and breath sounds normal. No respiratory distress. He has no wheezes. He has no rales. He exhibits no tenderness.   Abdominal: Soft. Bowel sounds are normal. He exhibits no distension. There is no tenderness.   Musculoskeletal: Normal range of motion. He exhibits no edema.   Neurological: He is alert and oriented to person, place, and time. No cranial nerve deficit.   Skin: Skin is warm and dry. No rash noted. He is not diaphoretic. No erythema.   Psychiatric: He has a normal mood and affect. His behavior is normal.   Nursing note and vitals reviewed.    Vitals:    06/12/20 1450 06/12/20 1453   BP: 120/68 118/64   BP Location: Left arm Right arm   Patient Position: Sitting    Pulse: (!) 58    SpO2: (!) 94%    Weight: 93.1 kg (205 lb 4 oz)    Height: 5' 7" (1.702 m)      Lab Results   Component Value Date    CHOL 128 05/25/2020    CHOL 109 (L) 11/25/2019    CHOL 99 (L) 05/27/2019     Lab Results   Component Value Date    HDL 47 05/25/2020    HDL 45 11/25/2019    HDL 39 (L) 05/27/2019     Lab Results   Component Value Date    LDLCALC 68.2 05/25/2020    LDLCALC 54.2 (L) 11/25/2019    LDLCALC 47.2 (L) 05/27/2019     Lab Results   Component Value Date    TRIG 64 05/25/2020    TRIG 49 11/25/2019    TRIG 64 05/27/2019     Lab Results   Component Value Date    CHOLHDL 36.7 05/25/2020    CHOLHDL 41.3 11/25/2019    CHOLHDL 39.4 05/27/2019       Chemistry        Component Value Date/Time     05/25/2020 0810    K 4.4 05/25/2020 0810     05/25/2020 0810    CO2 25 05/25/2020 0810    BUN 21 05/25/2020 0810    CREATININE 1.1 " 05/25/2020 0810    GLU 89 05/25/2020 0810        Component Value Date/Time    CALCIUM 9.1 05/25/2020 0810    ALKPHOS 63 05/25/2020 0810    AST 22 05/25/2020 0810    ALT 25 05/25/2020 0810    BILITOT 0.6 05/25/2020 0810    ESTGFRAFRICA >60.0 05/25/2020 0810    EGFRNONAA >60.0 05/25/2020 0810          No results found for: TSH  No results found for: INR, PROTIME  No results found for: WBC, HGB, HCT, MCV, PLT  BMP  Sodium   Date Value Ref Range Status   05/25/2020 141 136 - 145 mmol/L Final     Potassium   Date Value Ref Range Status   05/25/2020 4.4 3.5 - 5.1 mmol/L Final     Chloride   Date Value Ref Range Status   05/25/2020 110 95 - 110 mmol/L Final     CO2   Date Value Ref Range Status   05/25/2020 25 23 - 29 mmol/L Final     BUN, Bld   Date Value Ref Range Status   05/25/2020 21 8 - 23 mg/dL Final     Creatinine   Date Value Ref Range Status   05/25/2020 1.1 0.5 - 1.4 mg/dL Final     Calcium   Date Value Ref Range Status   05/25/2020 9.1 8.7 - 10.5 mg/dL Final     Anion Gap   Date Value Ref Range Status   05/25/2020 6 (L) 8 - 16 mmol/L Final     eGFR if    Date Value Ref Range Status   05/25/2020 >60.0 >60 mL/min/1.73 m^2 Final     eGFR if non    Date Value Ref Range Status   05/25/2020 >60.0 >60 mL/min/1.73 m^2 Final     Comment:     Calculation used to obtain the estimated glomerular filtration  rate (eGFR) is the CKD-EPI equation.        CrCl cannot be calculated (Patient's most recent lab result is older than the maximum 7 days allowed.).    Assessment:     1. Coronary artery disease involving native coronary artery of native heart without angina pectoris    2. Hyperlipidemia, unspecified hyperlipidemia type    3. Essential hypertension    4. Abnormal stress test      Still asymptomatic doing well clinically no angina  Counseled about diet exercise compliance    Plan:   Continue current therapy  Cardiac low salt diet.  Risk factor modification and excercise program.  F/u in 6  months with lipid cmp  Echo stress cardiolite.

## 2020-12-09 ENCOUNTER — TELEPHONE (OUTPATIENT)
Dept: CARDIOLOGY | Facility: CLINIC | Age: 65
End: 2020-12-09

## 2020-12-09 NOTE — TELEPHONE ENCOUNTER
Placed call to patient. States he read over the paperwork and understands his prep for the test  ----- Message from Morelia Grimes sent at 12/9/2020 12:10 PM CST -----  Contact: pt  Pt requesting a call back to know what he would need to do before test on 12/11/20. Please call pt back at [phone]

## 2020-12-11 ENCOUNTER — HOSPITAL ENCOUNTER (OUTPATIENT)
Dept: CARDIOLOGY | Facility: HOSPITAL | Age: 65
Discharge: HOME | End: 2020-12-11
Attending: INTERNAL MEDICINE
Payer: COMMERCIAL

## 2020-12-11 ENCOUNTER — HOSPITAL ENCOUNTER (OUTPATIENT)
Dept: RADIOLOGY | Facility: HOSPITAL | Age: 65
Discharge: HOME | End: 2020-12-11
Attending: INTERNAL MEDICINE
Payer: COMMERCIAL

## 2020-12-11 VITALS — BODY MASS INDEX: 32.65 KG/M2 | WEIGHT: 208 LBS | HEIGHT: 67 IN

## 2020-12-11 DIAGNOSIS — R94.39 ABNORMAL STRESS TEST: ICD-10-CM

## 2020-12-11 DIAGNOSIS — I25.10 CORONARY ARTERY DISEASE INVOLVING NATIVE CORONARY ARTERY OF NATIVE HEART WITHOUT ANGINA PECTORIS: ICD-10-CM

## 2020-12-11 LAB
AORTIC ROOT ANNULUS: 2.6 CM
AV INDEX (PROSTH): 0.8
AV MEAN GRADIENT: 5 MMHG
AV PEAK GRADIENT: 8 MMHG
AV VALVE AREA: 2.5 CM2
AV VELOCITY RATIO: 0.9
BSA FOR ECHO PROCEDURE: 2.11 M2
CV ECHO LV RWT: 0.55 CM
DOP CALC AO PEAK VEL: 1.44 M/S
DOP CALC AO VTI: 37.14 CM
DOP CALC LVOT AREA: 3.1 CM2
DOP CALC LVOT DIAMETER: 2 CM
DOP CALC LVOT PEAK VEL: 1.29 M/S
DOP CALC LVOT STROKE VOLUME: 92.91 CM3
DOP CALC RVOT PEAK VEL: 0.83 M/S
DOP CALC RVOT VTI: 23.4 CM
DOP CALCLVOT PEAK VEL VTI: 29.59 CM
E WAVE DECELERATION TIME: 211.24 MSEC
E/A RATIO: 1.54
E/E' RATIO: 9 M/S
ECHO LV POSTERIOR WALL: 1.17 CM (ref 0.6–1.1)
FRACTIONAL SHORTENING: 36 % (ref 28–44)
INTERVENTRICULAR SEPTUM: 1.22 CM (ref 0.6–1.1)
IVRT: 97.05 MSEC
LA MAJOR: 4.59 CM
LA WIDTH: 3.11 CM
LEFT ATRIUM SIZE: 3.76 CM
LEFT INTERNAL DIMENSION IN SYSTOLE: 2.72 CM (ref 2.1–4)
LEFT VENTRICLE DIASTOLIC VOLUME INDEX: 38.93 ML/M2
LEFT VENTRICLE DIASTOLIC VOLUME: 80.05 ML
LEFT VENTRICLE MASS INDEX: 87 G/M2
LEFT VENTRICLE SYSTOLIC VOLUME INDEX: 13.4 ML/M2
LEFT VENTRICLE SYSTOLIC VOLUME: 27.61 ML
LEFT VENTRICULAR INTERNAL DIMENSION IN DIASTOLE: 4.23 CM (ref 3.5–6)
LEFT VENTRICULAR MASS: 179.01 G
LV LATERAL E/E' RATIO: 8.31 M/S
LV SEPTAL E/E' RATIO: 9.82 M/S
MV A" WAVE DURATION": 9.42 MSEC
MV PEAK A VEL: 0.7 M/S
MV PEAK E VEL: 1.08 M/S
PISA TR MAX VEL: 2.6 M/S
PULM VEIN S/D RATIO: 1.31
PV MEAN GRADIENT: 1.54 MMHG
PV PEAK D VEL: 0.42 M/S
PV PEAK S VEL: 0.55 M/S
PV PEAK VELOCITY: 1.15 CM/S
RA MAJOR: 4.67 CM
RA WIDTH: 3.19 CM
RIGHT VENTRICULAR END-DIASTOLIC DIMENSION: 2.71 CM
SINUS: 2.36 CM
STJ: 2.51 CM
TDI LATERAL: 0.13 M/S
TDI SEPTAL: 0.11 M/S
TDI: 0.12 M/S
TR MAX PG: 27 MMHG
TRICUSPID ANNULAR PLANE SYSTOLIC EXCURSION: 2.33 CM

## 2020-12-11 PROCEDURE — 93016 STRESS TEST WITH MYOCARDIAL PERFUSION (CUPID ONLY): ICD-10-PCS | Mod: ,,, | Performed by: INTERNAL MEDICINE

## 2020-12-11 PROCEDURE — 93016 CV STRESS TEST SUPVJ ONLY: CPT | Mod: ,,, | Performed by: INTERNAL MEDICINE

## 2020-12-11 PROCEDURE — 93018 STRESS TEST WITH MYOCARDIAL PERFUSION (CUPID ONLY): ICD-10-PCS | Mod: ,,, | Performed by: INTERNAL MEDICINE

## 2020-12-11 PROCEDURE — 78452 STRESS TEST WITH MYOCARDIAL PERFUSION (CUPID ONLY): ICD-10-PCS | Mod: 26,,, | Performed by: INTERNAL MEDICINE

## 2020-12-11 PROCEDURE — 78452 HT MUSCLE IMAGE SPECT MULT: CPT | Mod: 26,,, | Performed by: INTERNAL MEDICINE

## 2020-12-11 PROCEDURE — 93306 TTE W/DOPPLER COMPLETE: CPT

## 2020-12-11 PROCEDURE — 93018 CV STRESS TEST I&R ONLY: CPT | Mod: ,,, | Performed by: INTERNAL MEDICINE

## 2020-12-11 PROCEDURE — A9502 TC99M TETROFOSMIN: HCPCS

## 2020-12-11 PROCEDURE — 93017 CV STRESS TEST TRACING ONLY: CPT

## 2020-12-11 PROCEDURE — 93306 ECHO (CUPID ONLY): ICD-10-PCS | Mod: 26,,, | Performed by: INTERNAL MEDICINE

## 2020-12-11 PROCEDURE — 93306 TTE W/DOPPLER COMPLETE: CPT | Mod: 26,,, | Performed by: INTERNAL MEDICINE

## 2020-12-12 LAB
CV STRESS BASE HR: 56 BPM
DIASTOLIC BLOOD PRESSURE: 70 MMHG
NUC REST EJECTION FRACTION: 58
OHS CV CPX 1 MINUTE RECOVERY HEART RATE: 116 BPM
OHS CV CPX 85 PERCENT MAX PREDICTED HEART RATE MALE: 133
OHS CV CPX ESTIMATED METS: 12
OHS CV CPX MAX PREDICTED HEART RATE: 157
OHS CV CPX PATIENT IS FEMALE: 0
OHS CV CPX PATIENT IS MALE: 1
OHS CV CPX PEAK DIASTOLIC BLOOD PRESSURE: 54 MMHG
OHS CV CPX PEAK HEAR RATE: 134 BPM
OHS CV CPX PEAK RATE PRESSURE PRODUCT: NORMAL
OHS CV CPX PEAK SYSTOLIC BLOOD PRESSURE: 152 MMHG
OHS CV CPX PERCENT MAX PREDICTED HEART RATE ACHIEVED: 85
OHS CV CPX RATE PRESSURE PRODUCT PRESENTING: 6776
STRESS ECHO POST EXERCISE DUR MIN: 10 MINUTES
STRESS ECHO POST EXERCISE DUR SEC: 38 SECONDS
STRESS ST DEPRESSION: 2 MM
SYSTOLIC BLOOD PRESSURE: 121 MMHG

## 2020-12-14 ENCOUNTER — LAB VISIT (OUTPATIENT)
Dept: LAB | Facility: HOSPITAL | Age: 65
End: 2020-12-14
Attending: INTERNAL MEDICINE
Payer: COMMERCIAL

## 2020-12-14 DIAGNOSIS — E78.5 HYPERLIPIDEMIA, UNSPECIFIED HYPERLIPIDEMIA TYPE: ICD-10-CM

## 2020-12-14 DIAGNOSIS — I25.10 CORONARY ARTERY DISEASE INVOLVING NATIVE CORONARY ARTERY OF NATIVE HEART WITHOUT ANGINA PECTORIS: ICD-10-CM

## 2020-12-14 LAB
ALBUMIN SERPL BCP-MCNC: 3.9 G/DL (ref 3.5–5.2)
ALP SERPL-CCNC: 78 U/L (ref 55–135)
ALT SERPL W/O P-5'-P-CCNC: 35 U/L (ref 10–44)
ANION GAP SERPL CALC-SCNC: 8 MMOL/L (ref 8–16)
AST SERPL-CCNC: 30 U/L (ref 10–40)
BILIRUB SERPL-MCNC: 0.7 MG/DL (ref 0.1–1)
BUN SERPL-MCNC: 11 MG/DL (ref 8–23)
CALCIUM SERPL-MCNC: 9.5 MG/DL (ref 8.7–10.5)
CHLORIDE SERPL-SCNC: 107 MMOL/L (ref 95–110)
CHOLEST SERPL-MCNC: 104 MG/DL (ref 120–199)
CHOLEST/HDLC SERPL: 2.4 {RATIO} (ref 2–5)
CO2 SERPL-SCNC: 27 MMOL/L (ref 23–29)
CREAT SERPL-MCNC: 1 MG/DL (ref 0.5–1.4)
EST. GFR  (AFRICAN AMERICAN): >60 ML/MIN/1.73 M^2
EST. GFR  (NON AFRICAN AMERICAN): >60 ML/MIN/1.73 M^2
GLUCOSE SERPL-MCNC: 95 MG/DL (ref 70–110)
HDLC SERPL-MCNC: 44 MG/DL (ref 40–75)
HDLC SERPL: 42.3 % (ref 20–50)
LDLC SERPL CALC-MCNC: 47.8 MG/DL (ref 63–159)
NONHDLC SERPL-MCNC: 60 MG/DL
POTASSIUM SERPL-SCNC: 5 MMOL/L (ref 3.5–5.1)
PROT SERPL-MCNC: 7.3 G/DL (ref 6–8.4)
SODIUM SERPL-SCNC: 142 MMOL/L (ref 136–145)
TRIGL SERPL-MCNC: 61 MG/DL (ref 30–150)

## 2020-12-14 PROCEDURE — 80053 COMPREHEN METABOLIC PANEL: CPT

## 2020-12-14 PROCEDURE — 80061 LIPID PANEL: CPT

## 2020-12-14 PROCEDURE — 36415 COLL VENOUS BLD VENIPUNCTURE: CPT | Mod: PO

## 2020-12-17 ENCOUNTER — OFFICE VISIT (OUTPATIENT)
Dept: CARDIOLOGY | Facility: CLINIC | Age: 65
End: 2020-12-17
Payer: COMMERCIAL

## 2020-12-17 VITALS
WEIGHT: 209.19 LBS | DIASTOLIC BLOOD PRESSURE: 68 MMHG | BODY MASS INDEX: 32.83 KG/M2 | RESPIRATION RATE: 16 BRPM | SYSTOLIC BLOOD PRESSURE: 104 MMHG | HEART RATE: 62 BPM | HEIGHT: 67 IN | OXYGEN SATURATION: 98 %

## 2020-12-17 DIAGNOSIS — R94.39 ABNORMAL STRESS TEST: ICD-10-CM

## 2020-12-17 DIAGNOSIS — I25.10 CORONARY ARTERY DISEASE INVOLVING NATIVE CORONARY ARTERY OF NATIVE HEART WITHOUT ANGINA PECTORIS: Primary | ICD-10-CM

## 2020-12-17 DIAGNOSIS — E78.5 HYPERLIPIDEMIA, UNSPECIFIED HYPERLIPIDEMIA TYPE: ICD-10-CM

## 2020-12-17 DIAGNOSIS — I10 ESSENTIAL HYPERTENSION: ICD-10-CM

## 2020-12-17 PROCEDURE — 3008F PR BODY MASS INDEX (BMI) DOCUMENTED: ICD-10-PCS | Mod: CPTII,S$GLB,, | Performed by: INTERNAL MEDICINE

## 2020-12-17 PROCEDURE — 99999 PR PBB SHADOW E&M-EST. PATIENT-LVL IV: ICD-10-PCS | Mod: PBBFAC,,, | Performed by: INTERNAL MEDICINE

## 2020-12-17 PROCEDURE — 3078F PR MOST RECENT DIASTOLIC BLOOD PRESSURE < 80 MM HG: ICD-10-PCS | Mod: CPTII,S$GLB,, | Performed by: INTERNAL MEDICINE

## 2020-12-17 PROCEDURE — 99214 OFFICE O/P EST MOD 30 MIN: CPT | Mod: S$GLB,,, | Performed by: INTERNAL MEDICINE

## 2020-12-17 PROCEDURE — 3074F PR MOST RECENT SYSTOLIC BLOOD PRESSURE < 130 MM HG: ICD-10-PCS | Mod: CPTII,S$GLB,, | Performed by: INTERNAL MEDICINE

## 2020-12-17 PROCEDURE — 3074F SYST BP LT 130 MM HG: CPT | Mod: CPTII,S$GLB,, | Performed by: INTERNAL MEDICINE

## 2020-12-17 PROCEDURE — 99999 PR PBB SHADOW E&M-EST. PATIENT-LVL IV: CPT | Mod: PBBFAC,,, | Performed by: INTERNAL MEDICINE

## 2020-12-17 PROCEDURE — 3008F BODY MASS INDEX DOCD: CPT | Mod: CPTII,S$GLB,, | Performed by: INTERNAL MEDICINE

## 2020-12-17 PROCEDURE — 3078F DIAST BP <80 MM HG: CPT | Mod: CPTII,S$GLB,, | Performed by: INTERNAL MEDICINE

## 2020-12-17 PROCEDURE — 99214 PR OFFICE/OUTPT VISIT, EST, LEVL IV, 30-39 MIN: ICD-10-PCS | Mod: S$GLB,,, | Performed by: INTERNAL MEDICINE

## 2020-12-17 NOTE — PROGRESS NOTES
Subjective:   Patient ID:  Chino Manzano is a 64 y.o. male who presents for follow up of Follow-up      HPI   6/12/2020  A 63 Yo male with cad medically treated with negative cardiolite fairly active exercises regularily has no angina or shortness of breath tries to be compliant with diet weight unchanged.has still no cardiovascular complaints. No change in status since last visit in December.he is being more liberal with his evening snacks. Rasing his ldl.has normal ekg with pac. He pl;ays [place]ulf asymptomatic cuts grass with push mower asymptomatic.    12/17/2020  Asymptomatic exercising eats more but has increased exercise no new complaints. His lvf is normal his stress test good exercise tolerance   lvf normal by echo he has no angina. He thinks he had the virus since his wife had it and he had cough and loss sense of smell and taste.   Past Medical History:   Diagnosis Date    Coronary artery disease     Heart murmur     Hypercholesterolemia     Hyperlipidemia     Hypertension     Insulin resistance syndrome     Mumps     childhood    MARCIA (obstructive sleep apnea)        Past Surgical History:   Procedure Laterality Date    CARDIAC CATHETERIZATION         Social History     Tobacco Use    Smoking status: Never Smoker    Smokeless tobacco: Never Used   Substance Use Topics    Alcohol use: Yes     Comment: occasional    Drug use: No       Family History   Problem Relation Age of Onset    Heart attack Brother 59    Heart attack Maternal Grandfather 65    Heart attack Paternal Grandfather 45    Hypertension Brother     Glaucoma Mother     Cancer Father     Stroke Neg Hx        Current Outpatient Medications   Medication Sig    aspirin (ECOTRIN) 81 MG EC tablet Take 81 mg by mouth once daily.    BYSTOLIC 5 mg Tab TAKE ONE TABLET BY MOUTH EVERY EVENING    ergocalciferol (DRISDOL) 8,000 unit/mL Drop Take by mouth once daily.    ramipril (ALTACE) 2.5 MG capsule TAKE ONE CAPSULE EVERY DAY.     rosuvastatin (CRESTOR) 20 MG tablet Take 1 tablet (20 mg total) by mouth every evening.    amoxicillin (AMOXIL) 875 MG tablet Take 1 tablet (875 mg total) by mouth every 12 (twelve) hours. (Patient not taking: Reported on 6/12/2020)     No current facility-administered medications for this visit.      Current Outpatient Medications on File Prior to Visit   Medication Sig    aspirin (ECOTRIN) 81 MG EC tablet Take 81 mg by mouth once daily.    BYSTOLIC 5 mg Tab TAKE ONE TABLET BY MOUTH EVERY EVENING    ergocalciferol (DRISDOL) 8,000 unit/mL Drop Take by mouth once daily.    ramipril (ALTACE) 2.5 MG capsule TAKE ONE CAPSULE EVERY DAY.    rosuvastatin (CRESTOR) 20 MG tablet Take 1 tablet (20 mg total) by mouth every evening.    amoxicillin (AMOXIL) 875 MG tablet Take 1 tablet (875 mg total) by mouth every 12 (twelve) hours. (Patient not taking: Reported on 6/12/2020)     No current facility-administered medications on file prior to visit.      Review of patient's allergies indicates:  No Known Allergies  Review of Systems   Constitution: Negative for malaise/fatigue.   Eyes: Negative for blurred vision.   Cardiovascular: Negative for chest pain, claudication, cyanosis, dyspnea on exertion, irregular heartbeat, leg swelling, near-syncope, orthopnea, palpitations and paroxysmal nocturnal dyspnea.   Respiratory: Negative for cough, hemoptysis and shortness of breath.    Hematologic/Lymphatic: Negative for bleeding problem. Does not bruise/bleed easily.   Skin: Negative for dry skin and itching.   Musculoskeletal: Negative for falls, muscle weakness and myalgias.   Gastrointestinal: Negative for abdominal pain, diarrhea, heartburn, hematemesis, hematochezia and melena.   Genitourinary: Negative for flank pain and hematuria.   Neurological: Negative for dizziness, focal weakness, headaches, light-headedness, numbness, paresthesias, seizures and weakness.   Psychiatric/Behavioral: Negative for altered mental status  "and memory loss. The patient is not nervous/anxious.    Allergic/Immunologic: Negative for hives.       Objective:   Physical Exam   Constitutional: He is oriented to person, place, and time. He appears well-developed and well-nourished. No distress.   HENT:   Head: Normocephalic and atraumatic.   Eyes: Pupils are equal, round, and reactive to light. EOM are normal. Right eye exhibits no discharge. Left eye exhibits no discharge.   Neck: Neck supple. No JVD present. No thyromegaly present.   Cardiovascular: Normal rate, regular rhythm, normal heart sounds and intact distal pulses. Exam reveals no gallop and no friction rub.   No murmur heard.  Pulmonary/Chest: Effort normal and breath sounds normal. No respiratory distress. He has no wheezes. He has no rales. He exhibits no tenderness.   Abdominal: Soft. Bowel sounds are normal. He exhibits no distension. There is no abdominal tenderness.   Musculoskeletal: Normal range of motion.         General: No edema.   Neurological: He is alert and oriented to person, place, and time. No cranial nerve deficit.   Skin: Skin is warm and dry. No rash noted. He is not diaphoretic. No erythema.   Psychiatric: He has a normal mood and affect. His behavior is normal.   Nursing note and vitals reviewed.    Vitals:    12/17/20 1458 12/17/20 1502   BP: 110/72 104/68   BP Location: Right arm Left arm   Patient Position: Sitting Sitting   BP Method: Large (Manual) Large (Manual)   Pulse: 62    Resp: 16    SpO2: 98%    Weight: 94.9 kg (209 lb 3.5 oz)    Height: 5' 7" (1.702 m)      Lab Results   Component Value Date    CHOL 104 (L) 12/14/2020    CHOL 128 05/25/2020    CHOL 109 (L) 11/25/2019     Lab Results   Component Value Date    HDL 44 12/14/2020    HDL 47 05/25/2020    HDL 45 11/25/2019     Lab Results   Component Value Date    LDLCALC 47.8 (L) 12/14/2020    LDLCALC 68.2 05/25/2020    LDLCALC 54.2 (L) 11/25/2019     Lab Results   Component Value Date    TRIG 61 12/14/2020    TRIG 64 " 05/25/2020    TRIG 49 11/25/2019     Lab Results   Component Value Date    CHOLHDL 42.3 12/14/2020    CHOLHDL 36.7 05/25/2020    CHOLHDL 41.3 11/25/2019       Chemistry        Component Value Date/Time     12/14/2020 0910    K 5.0 12/14/2020 0910     12/14/2020 0910    CO2 27 12/14/2020 0910    BUN 11 12/14/2020 0910    CREATININE 1.0 12/14/2020 0910    GLU 95 12/14/2020 0910        Component Value Date/Time    CALCIUM 9.5 12/14/2020 0910    ALKPHOS 78 12/14/2020 0910    AST 30 12/14/2020 0910    ALT 35 12/14/2020 0910    BILITOT 0.7 12/14/2020 0910    ESTGFRAFRICA >60.0 12/14/2020 0910    EGFRNONAA >60.0 12/14/2020 0910          No results found for: TSH  No results found for: INR, PROTIME  No results found for: WBC, HGB, HCT, MCV, PLT  BMP  Sodium   Date Value Ref Range Status   12/14/2020 142 136 - 145 mmol/L Final     Potassium   Date Value Ref Range Status   12/14/2020 5.0 3.5 - 5.1 mmol/L Final     Chloride   Date Value Ref Range Status   12/14/2020 107 95 - 110 mmol/L Final     CO2   Date Value Ref Range Status   12/14/2020 27 23 - 29 mmol/L Final     BUN   Date Value Ref Range Status   12/14/2020 11 8 - 23 mg/dL Final     Creatinine   Date Value Ref Range Status   12/14/2020 1.0 0.5 - 1.4 mg/dL Final     Calcium   Date Value Ref Range Status   12/14/2020 9.5 8.7 - 10.5 mg/dL Final     Anion Gap   Date Value Ref Range Status   12/14/2020 8 8 - 16 mmol/L Final     eGFR if    Date Value Ref Range Status   12/14/2020 >60.0 >60 mL/min/1.73 m^2 Final     eGFR if non    Date Value Ref Range Status   12/14/2020 >60.0 >60 mL/min/1.73 m^2 Final     Comment:     Calculation used to obtain the estimated glomerular filtration  rate (eGFR) is the CKD-EPI equation.        Estimated Creatinine Clearance: 83 mL/min (based on SCr of 1 mg/dL).      Normal myocardial perfusion scan. There is no evidence of myocardial ischemia or infarction.    Gated perfusion images showed an  ejection fraction of 58% at rest and 65%.    There is normal wall motion at rest and post stress.    LV cavity size is normal at rest and normal at stress.    The EKG portion of this study is positive for ischemia.    The patient reported no chest pain during the stress test.    The exercise capacity was above average.    ·  There is left ventricular concentric remodeling.  · With normal systolic function. The estimated ejection fraction is 60%.  · Normal left ventricular diastolic function.  Normal right ventricular size with normal right ventricular systolic function.  Assessment:     1. Coronary artery disease involving native coronary artery of native heart without angina pectoris    2. Hyperlipidemia, unspecified hyperlipidemia type    3. Abnormal stress test    4. Essential hypertension      Asymptomatic cad with negative cardiolite excellent exercise tolerance and lipids on target.   Plan:   Continue current therapy  Cardiac low salt diet.  Risk factor modification and excercise program./weight loss  F/u in 6 months with lipid cmp .

## 2021-04-29 ENCOUNTER — PATIENT MESSAGE (OUTPATIENT)
Dept: RESEARCH | Facility: HOSPITAL | Age: 66
End: 2021-04-29

## 2021-06-04 ENCOUNTER — LAB VISIT (OUTPATIENT)
Dept: LAB | Facility: HOSPITAL | Age: 66
End: 2021-06-04
Attending: INTERNAL MEDICINE
Payer: COMMERCIAL

## 2021-06-04 DIAGNOSIS — I25.10 CORONARY ARTERY DISEASE INVOLVING NATIVE CORONARY ARTERY OF NATIVE HEART WITHOUT ANGINA PECTORIS: ICD-10-CM

## 2021-06-04 PROCEDURE — 36415 COLL VENOUS BLD VENIPUNCTURE: CPT | Mod: PO | Performed by: INTERNAL MEDICINE

## 2021-06-04 PROCEDURE — 80061 LIPID PANEL: CPT | Performed by: INTERNAL MEDICINE

## 2021-06-04 PROCEDURE — 80053 COMPREHEN METABOLIC PANEL: CPT | Performed by: INTERNAL MEDICINE

## 2021-06-05 LAB
ALBUMIN SERPL BCP-MCNC: 4.2 G/DL (ref 3.5–5.2)
ALP SERPL-CCNC: 69 U/L (ref 55–135)
ALT SERPL W/O P-5'-P-CCNC: 33 U/L (ref 10–44)
ANION GAP SERPL CALC-SCNC: 10 MMOL/L (ref 8–16)
AST SERPL-CCNC: 31 U/L (ref 10–40)
BILIRUB SERPL-MCNC: 1.2 MG/DL (ref 0.1–1)
BUN SERPL-MCNC: 14 MG/DL (ref 8–23)
CALCIUM SERPL-MCNC: 9.9 MG/DL (ref 8.7–10.5)
CHLORIDE SERPL-SCNC: 106 MMOL/L (ref 95–110)
CHOLEST SERPL-MCNC: 100 MG/DL (ref 120–199)
CHOLEST/HDLC SERPL: 2.3 {RATIO} (ref 2–5)
CO2 SERPL-SCNC: 25 MMOL/L (ref 23–29)
CREAT SERPL-MCNC: 1.2 MG/DL (ref 0.5–1.4)
EST. GFR  (AFRICAN AMERICAN): >60 ML/MIN/1.73 M^2
EST. GFR  (NON AFRICAN AMERICAN): >60 ML/MIN/1.73 M^2
GLUCOSE SERPL-MCNC: 87 MG/DL (ref 70–110)
HDLC SERPL-MCNC: 44 MG/DL (ref 40–75)
HDLC SERPL: 44 % (ref 20–50)
LDLC SERPL CALC-MCNC: 41.2 MG/DL (ref 63–159)
NONHDLC SERPL-MCNC: 56 MG/DL
POTASSIUM SERPL-SCNC: 5 MMOL/L (ref 3.5–5.1)
PROT SERPL-MCNC: 7.4 G/DL (ref 6–8.4)
SODIUM SERPL-SCNC: 141 MMOL/L (ref 136–145)
TRIGL SERPL-MCNC: 74 MG/DL (ref 30–150)

## 2021-06-10 ENCOUNTER — OFFICE VISIT (OUTPATIENT)
Dept: CARDIOLOGY | Facility: CLINIC | Age: 66
End: 2021-06-10
Payer: COMMERCIAL

## 2021-06-10 VITALS
SYSTOLIC BLOOD PRESSURE: 110 MMHG | DIASTOLIC BLOOD PRESSURE: 68 MMHG | BODY MASS INDEX: 33.3 KG/M2 | WEIGHT: 212.19 LBS | OXYGEN SATURATION: 96 % | HEART RATE: 59 BPM | HEIGHT: 67 IN

## 2021-06-10 DIAGNOSIS — R94.39 ABNORMAL STRESS TEST: ICD-10-CM

## 2021-06-10 DIAGNOSIS — E78.49 OTHER HYPERLIPIDEMIA: ICD-10-CM

## 2021-06-10 DIAGNOSIS — I10 ESSENTIAL HYPERTENSION: ICD-10-CM

## 2021-06-10 DIAGNOSIS — I25.10 CORONARY ARTERY DISEASE INVOLVING NATIVE CORONARY ARTERY OF NATIVE HEART WITHOUT ANGINA PECTORIS: Primary | ICD-10-CM

## 2021-06-10 PROCEDURE — 3008F BODY MASS INDEX DOCD: CPT | Mod: CPTII,S$GLB,, | Performed by: INTERNAL MEDICINE

## 2021-06-10 PROCEDURE — 99999 PR PBB SHADOW E&M-EST. PATIENT-LVL III: ICD-10-PCS | Mod: PBBFAC,,, | Performed by: INTERNAL MEDICINE

## 2021-06-10 PROCEDURE — 99999 PR PBB SHADOW E&M-EST. PATIENT-LVL III: CPT | Mod: PBBFAC,,, | Performed by: INTERNAL MEDICINE

## 2021-06-10 PROCEDURE — 99214 PR OFFICE/OUTPT VISIT, EST, LEVL IV, 30-39 MIN: ICD-10-PCS | Mod: S$GLB,,, | Performed by: INTERNAL MEDICINE

## 2021-06-10 PROCEDURE — 3008F PR BODY MASS INDEX (BMI) DOCUMENTED: ICD-10-PCS | Mod: CPTII,S$GLB,, | Performed by: INTERNAL MEDICINE

## 2021-06-10 PROCEDURE — 99214 OFFICE O/P EST MOD 30 MIN: CPT | Mod: S$GLB,,, | Performed by: INTERNAL MEDICINE

## 2021-08-19 DIAGNOSIS — E78.49 OTHER HYPERLIPIDEMIA: Primary | ICD-10-CM

## 2021-08-19 RX ORDER — ROSUVASTATIN CALCIUM 20 MG/1
20 TABLET, COATED ORAL NIGHTLY
Qty: 30 TABLET | Refills: 11 | Status: SHIPPED | OUTPATIENT
Start: 2021-08-19 | End: 2021-12-02 | Stop reason: SDUPTHER

## 2021-12-02 PROBLEM — I25.10 ATHEROSCLEROSIS OF NATIVE CORONARY ARTERY OF NATIVE HEART WITHOUT ANGINA PECTORIS: Status: ACTIVE | Noted: 2018-07-23

## 2021-12-13 ENCOUNTER — OFFICE VISIT (OUTPATIENT)
Dept: CARDIOLOGY | Facility: CLINIC | Age: 66
End: 2021-12-13
Payer: COMMERCIAL

## 2021-12-13 VITALS
DIASTOLIC BLOOD PRESSURE: 78 MMHG | SYSTOLIC BLOOD PRESSURE: 128 MMHG | HEIGHT: 67 IN | BODY MASS INDEX: 34.46 KG/M2 | OXYGEN SATURATION: 96 % | HEART RATE: 52 BPM | WEIGHT: 219.56 LBS

## 2021-12-13 DIAGNOSIS — E78.49 OTHER HYPERLIPIDEMIA: ICD-10-CM

## 2021-12-13 DIAGNOSIS — R94.39 ABNORMAL STRESS TEST: ICD-10-CM

## 2021-12-13 DIAGNOSIS — I25.10 ATHEROSCLEROSIS OF NATIVE CORONARY ARTERY OF NATIVE HEART WITHOUT ANGINA PECTORIS: ICD-10-CM

## 2021-12-13 DIAGNOSIS — I10 PRIMARY HYPERTENSION: ICD-10-CM

## 2021-12-13 DIAGNOSIS — I25.10 CORONARY ARTERY DISEASE INVOLVING NATIVE CORONARY ARTERY OF NATIVE HEART WITHOUT ANGINA PECTORIS: Primary | ICD-10-CM

## 2021-12-13 PROCEDURE — 99213 PR OFFICE/OUTPT VISIT, EST, LEVL III, 20-29 MIN: ICD-10-PCS | Mod: S$GLB,,, | Performed by: INTERNAL MEDICINE

## 2021-12-13 PROCEDURE — 99999 PR PBB SHADOW E&M-EST. PATIENT-LVL III: ICD-10-PCS | Mod: PBBFAC,,, | Performed by: INTERNAL MEDICINE

## 2021-12-13 PROCEDURE — 99999 PR PBB SHADOW E&M-EST. PATIENT-LVL III: CPT | Mod: PBBFAC,,, | Performed by: INTERNAL MEDICINE

## 2021-12-13 PROCEDURE — 99213 OFFICE O/P EST LOW 20 MIN: CPT | Mod: S$GLB,,, | Performed by: INTERNAL MEDICINE

## 2021-12-27 DIAGNOSIS — U07.1 COVID-19 VIRUS DETECTED: ICD-10-CM

## 2022-07-07 DIAGNOSIS — I10 ESSENTIAL HYPERTENSION: ICD-10-CM

## 2022-07-07 RX ORDER — RAMIPRIL 2.5 MG/1
2.5 CAPSULE ORAL DAILY
Qty: 90 CAPSULE | Refills: 1 | Status: SHIPPED | OUTPATIENT
Start: 2022-07-07 | End: 2023-01-03

## 2022-08-09 ENCOUNTER — LAB VISIT (OUTPATIENT)
Dept: LAB | Facility: HOSPITAL | Age: 67
End: 2022-08-09
Attending: INTERNAL MEDICINE
Payer: MEDICARE

## 2022-08-09 DIAGNOSIS — I25.10 CORONARY ARTERY DISEASE INVOLVING NATIVE CORONARY ARTERY OF NATIVE HEART WITHOUT ANGINA PECTORIS: ICD-10-CM

## 2022-08-09 LAB
ALBUMIN SERPL BCP-MCNC: 4.2 G/DL (ref 3.5–5.2)
ALP SERPL-CCNC: 65 U/L (ref 55–135)
ALT SERPL W/O P-5'-P-CCNC: 22 U/L (ref 10–44)
ANION GAP SERPL CALC-SCNC: 5 MMOL/L (ref 8–16)
AST SERPL-CCNC: 20 U/L (ref 10–40)
BILIRUB SERPL-MCNC: 0.9 MG/DL (ref 0.1–1)
BUN SERPL-MCNC: 18 MG/DL (ref 8–23)
CALCIUM SERPL-MCNC: 9.4 MG/DL (ref 8.7–10.5)
CHLORIDE SERPL-SCNC: 107 MMOL/L (ref 95–110)
CHOLEST SERPL-MCNC: 113 MG/DL (ref 120–199)
CHOLEST/HDLC SERPL: 2.7 {RATIO} (ref 2–5)
CO2 SERPL-SCNC: 28 MMOL/L (ref 23–29)
CREAT SERPL-MCNC: 1.1 MG/DL (ref 0.5–1.4)
EST. GFR  (NO RACE VARIABLE): >60 ML/MIN/1.73 M^2
GLUCOSE SERPL-MCNC: 95 MG/DL (ref 70–110)
HDLC SERPL-MCNC: 42 MG/DL (ref 40–75)
HDLC SERPL: 37.2 % (ref 20–50)
LDLC SERPL CALC-MCNC: 52.8 MG/DL (ref 63–159)
NONHDLC SERPL-MCNC: 71 MG/DL
POTASSIUM SERPL-SCNC: 4.8 MMOL/L (ref 3.5–5.1)
PROT SERPL-MCNC: 6.7 G/DL (ref 6–8.4)
SODIUM SERPL-SCNC: 140 MMOL/L (ref 136–145)
TRIGL SERPL-MCNC: 91 MG/DL (ref 30–150)

## 2022-08-09 PROCEDURE — 36415 COLL VENOUS BLD VENIPUNCTURE: CPT | Mod: PO | Performed by: INTERNAL MEDICINE

## 2022-08-09 PROCEDURE — 80061 LIPID PANEL: CPT | Performed by: INTERNAL MEDICINE

## 2022-08-09 PROCEDURE — 80053 COMPREHEN METABOLIC PANEL: CPT | Performed by: INTERNAL MEDICINE

## 2022-08-23 ENCOUNTER — HOSPITAL ENCOUNTER (OUTPATIENT)
Dept: CARDIOLOGY | Facility: HOSPITAL | Age: 67
Discharge: HOME | End: 2022-08-23
Attending: INTERNAL MEDICINE
Payer: MEDICARE

## 2022-08-23 ENCOUNTER — OFFICE VISIT (OUTPATIENT)
Dept: CARDIOLOGY | Facility: CLINIC | Age: 67
End: 2022-08-23
Payer: MEDICARE

## 2022-08-23 VITALS
BODY MASS INDEX: 34.87 KG/M2 | SYSTOLIC BLOOD PRESSURE: 118 MMHG | OXYGEN SATURATION: 97 % | WEIGHT: 222.69 LBS | DIASTOLIC BLOOD PRESSURE: 74 MMHG | HEART RATE: 48 BPM

## 2022-08-23 DIAGNOSIS — R94.39 ABNORMAL STRESS TEST: ICD-10-CM

## 2022-08-23 DIAGNOSIS — E78.49 OTHER HYPERLIPIDEMIA: ICD-10-CM

## 2022-08-23 DIAGNOSIS — R94.31 ABNORMAL ELECTROCARDIOGRAM (ECG) (EKG): ICD-10-CM

## 2022-08-23 DIAGNOSIS — I10 PRIMARY HYPERTENSION: ICD-10-CM

## 2022-08-23 DIAGNOSIS — I25.10 CORONARY ARTERY DISEASE INVOLVING NATIVE CORONARY ARTERY OF NATIVE HEART WITHOUT ANGINA PECTORIS: Primary | ICD-10-CM

## 2022-08-23 DIAGNOSIS — I25.10 CORONARY ARTERY DISEASE INVOLVING NATIVE CORONARY ARTERY OF NATIVE HEART WITHOUT ANGINA PECTORIS: ICD-10-CM

## 2022-08-23 DIAGNOSIS — I25.10 ATHEROSCLEROSIS OF NATIVE CORONARY ARTERY OF NATIVE HEART WITHOUT ANGINA PECTORIS: ICD-10-CM

## 2022-08-23 PROCEDURE — 93010 EKG 12-LEAD: ICD-10-PCS | Mod: ,,, | Performed by: INTERNAL MEDICINE

## 2022-08-23 PROCEDURE — 99214 PR OFFICE/OUTPT VISIT, EST, LEVL IV, 30-39 MIN: ICD-10-PCS | Mod: S$PBB,,, | Performed by: INTERNAL MEDICINE

## 2022-08-23 PROCEDURE — 93005 ELECTROCARDIOGRAM TRACING: CPT

## 2022-08-23 PROCEDURE — 99999 PR PBB SHADOW E&M-EST. PATIENT-LVL III: ICD-10-PCS | Mod: PBBFAC,,, | Performed by: INTERNAL MEDICINE

## 2022-08-23 PROCEDURE — 99999 PR PBB SHADOW E&M-EST. PATIENT-LVL III: CPT | Mod: PBBFAC,,, | Performed by: INTERNAL MEDICINE

## 2022-08-23 PROCEDURE — 99213 OFFICE O/P EST LOW 20 MIN: CPT | Mod: PBBFAC | Performed by: INTERNAL MEDICINE

## 2022-08-23 PROCEDURE — 93010 ELECTROCARDIOGRAM REPORT: CPT | Mod: ,,, | Performed by: INTERNAL MEDICINE

## 2022-08-23 PROCEDURE — 99214 OFFICE O/P EST MOD 30 MIN: CPT | Mod: S$PBB,,, | Performed by: INTERNAL MEDICINE

## 2022-08-23 NOTE — PROGRESS NOTES
Subjective:   Patient ID:  Chino Manzano is a 66 y.o. male who presents for follow up of No chief complaint on file.      HPI  The patient came in today for cardiac consult of Follow-up     Prior visit  . Chino had a calcium score that showed some moderate calcification that prompted a stress test where he went 12 minutes of the Jad protocol and had some EKG changes with no symptoms. He subsequently underwent cardiac catheterization on June 6 by Dr. Kamar Harry that demonstrated 2 vessel coronary artery disease. He was subsequently recommended to have bypass surgery but was told that he had a poor target in his LAD then sought a second opinion by Dr. Gil at [facility]. Dr. Gil ordered a nuclear perfusion study that was normal. It is unclear whether he made a full recommendation for any type of stenting but once again the patient went 9 minutes of the Jad protocol on the nuclear perfusion study without symptoms and with normal myocardial perfusion. He presently denies any angina he is on appropriate medical therapy I discussed the cardiac catheterization films which I reviewed personally I went over the nuclear perfusion study with him and recommended continued medical therapy at the present time because he has non-left main non-LAD disease and a poor LAD target without angina pectoris and with a normal perfusion study at a high workload.           6/12/2020  A 63 Yo male with cad medically treated with negative cardiolite fairly active exercises regularily has no angina or shortness of breath tries to be compliant with diet weight unchanged.has still no cardiovascular complaints. No change in status since last visit in December.he is being more liberal with his evening snacks. Rasing his ldl.has normal ekg with pac. He pl;ays javi asymptomatic cuts grass with push mower asymptomatic.     12/17/2020  Asymptomatic exercising eats more but has increased exercise no new complaints. His lvf is normal his  stress test good exercise tolerance   lvf normal by echo he has no angina. He thinks he had the virus since his wife had it and he had cough and loss sense of smell and taste.      6/10/20/21   he has no change in status he is exercising however eating a lot late at nite he cuts the grass . He is completely asymptomatic   Patient feels no chest pain, no sob, no leg swelling, no PND, no palpitation, no dizziness, no syncope, no CNS symptoms.     Patient has fairly good exercise tolerance.     Patient is compliant with medications.     Results for orders placed during the hospital encounter of 12/11/20     Nuclear Stress - Cardiology Interpreted     Interpretation Summary    Normal myocardial perfusion scan. There is no evidence of myocardial ischemia or infarction.    Gated perfusion images showed an ejection fraction of 58% at rest and 65%.    There is normal wall motion at rest and post stress.    LV cavity size is normal at rest and normal at stress.    The EKG portion of this study is positive for ischemia.    The patient reported no chest pain during the stress test.    The exercise capacity was above average.     Results for orders placed during the hospital encounter of 12/11/20     Echo Color Flow Doppler? Yes     Interpretation Summary  · There is left ventricular concentric remodeling.  · With normal systolic function. The estimated ejection fraction is 60%.  · Normal left ventricular diastolic function.  · Normal right ventricular size with normal right ventricular systolic function.        12/13/2021   Not walking as much still plays [employer] exercises has no new complainsts he is going snow skiing. Has gained few lbs has some scapular pain that seems musculoskeletal in nature . He is asymptomatic cardiac wise intermittent lightheadedness positional   Lipid ok although increased. Still ldl on target    8/23/2022   has no new complaints still active [employer] exercise. Ha sgaine dweight has been  compliant with meds   Past Medical History:   Diagnosis Date    Coronary artery disease     Heart murmur     Hypercholesterolemia     Hyperlipidemia     Hypertension     Insulin resistance syndrome     Mumps     childhood    MARCIA (obstructive sleep apnea)        Past Surgical History:   Procedure Laterality Date    CARDIAC CATHETERIZATION         Social History     Tobacco Use    Smoking status: Never Smoker    Smokeless tobacco: Never Used   Substance Use Topics    Alcohol use: Yes     Comment: occasional    Drug use: No       Family History   Problem Relation Age of Onset    Heart attack Brother 59    Heart attack Maternal Grandfather 65    Heart attack Paternal Grandfather 45    Hypertension Brother     Glaucoma Mother     Cancer Father     Stroke Neg Hx        Current Outpatient Medications   Medication Sig    aspirin (ECOTRIN) 81 MG EC tablet Take 81 mg by mouth once daily.    azithromycin (Z-JANEEN) 250 MG tablet Take 2 tablets by mouth on day 1; Take 1 tablet by mouth on days 2-5    ramipriL (ALTACE) 2.5 MG capsule Take 1 capsule (2.5 mg total) by mouth once daily.    rosuvastatin (CRESTOR) 20 MG tablet Take 1 tablet (20 mg total) by mouth every evening.    nebivoloL (BYSTOLIC) 5 MG Tab Take 1 tablet (5 mg total) by mouth every evening.     No current facility-administered medications for this visit.     Current Outpatient Medications on File Prior to Visit   Medication Sig    aspirin (ECOTRIN) 81 MG EC tablet Take 81 mg by mouth once daily.    azithromycin (Z-JANEEN) 250 MG tablet Take 2 tablets by mouth on day 1; Take 1 tablet by mouth on days 2-5    ramipriL (ALTACE) 2.5 MG capsule Take 1 capsule (2.5 mg total) by mouth once daily.    rosuvastatin (CRESTOR) 20 MG tablet Take 1 tablet (20 mg total) by mouth every evening.    nebivoloL (BYSTOLIC) 5 MG Tab Take 1 tablet (5 mg total) by mouth every evening.     No current facility-administered medications on file prior to visit.      Review of patient's allergies indicates:  No Known Allergies  Review of Systems   Constitutional: Negative for malaise/fatigue.   Eyes: Negative for blurred vision.   Cardiovascular: Negative for chest pain, claudication, cyanosis, dyspnea on exertion, irregular heartbeat, leg swelling, near-syncope, orthopnea, palpitations and paroxysmal nocturnal dyspnea.   Respiratory: Negative for cough, hemoptysis and shortness of breath.    Hematologic/Lymphatic: Negative for bleeding problem. Does not bruise/bleed easily.   Skin: Negative for dry skin and itching.   Musculoskeletal: Negative for falls, muscle weakness and myalgias.   Gastrointestinal: Negative for abdominal pain, diarrhea, heartburn, hematemesis, hematochezia and melena.   Genitourinary: Negative for flank pain and hematuria.   Neurological: Negative for dizziness, focal weakness, headaches, light-headedness, numbness, paresthesias, seizures and weakness.   Psychiatric/Behavioral: Negative for altered mental status and memory loss. The patient is not nervous/anxious.    Allergic/Immunologic: Negative for hives.       Objective:   Physical Exam  Vitals and nursing note reviewed.   Constitutional:       General: He is not in acute distress.     Appearance: He is well-developed. He is not diaphoretic.   HENT:      Head: Normocephalic and atraumatic.   Eyes:      General:         Right eye: No discharge.         Left eye: No discharge.      Pupils: Pupils are equal, round, and reactive to light.   Neck:      Thyroid: No thyromegaly.      Vascular: No JVD.   Cardiovascular:      Rate and Rhythm: Normal rate and regular rhythm.      Pulses: Normal pulses and intact distal pulses.      Heart sounds: Normal heart sounds. No murmur heard.    No friction rub. No gallop.   Pulmonary:      Effort: Pulmonary effort is normal. No respiratory distress.      Breath sounds: Normal breath sounds. No wheezing or rales.   Chest:      Chest wall: No tenderness.   Abdominal:       General: Bowel sounds are normal. There is no distension.      Palpations: Abdomen is soft.      Tenderness: There is no abdominal tenderness.   Musculoskeletal:         General: Normal range of motion.      Cervical back: Neck supple.      Right lower leg: No edema.      Left lower leg: No edema.   Skin:     General: Skin is warm and dry.      Findings: No erythema or rash.   Neurological:      Mental Status: He is alert and oriented to person, place, and time.      Cranial Nerves: No cranial nerve deficit.   Psychiatric:         Behavior: Behavior normal.         Judgment: Judgment normal.       Vitals:    08/23/22 1625 08/23/22 1628   BP: 116/70 118/74   Pulse: (!) 48 (!) 48   SpO2: 97%    Weight: 101 kg (222 lb 10.6 oz)      Lab Results   Component Value Date    CHOL 113 (L) 08/09/2022    CHOL 129 12/02/2021    CHOL 100 (L) 06/04/2021     Lab Results   Component Value Date    HDL 42 08/09/2022    HDL 45 12/02/2021    HDL 44 06/04/2021     Lab Results   Component Value Date    LDLCALC 52.8 (L) 08/09/2022    LDLCALC 66 12/02/2021    LDLCALC 41.2 (L) 06/04/2021     Lab Results   Component Value Date    TRIG 91 08/09/2022    TRIG 96 12/02/2021    TRIG 74 06/04/2021     Lab Results   Component Value Date    CHOLHDL 37.2 08/09/2022    CHOLHDL 44.0 06/04/2021    CHOLHDL 42.3 12/14/2020       Chemistry        Component Value Date/Time     08/09/2022 0807    K 4.8 08/09/2022 0807     08/09/2022 0807    CO2 28 08/09/2022 0807    BUN 18 08/09/2022 0807    CREATININE 1.1 08/09/2022 0807    GLU 95 08/09/2022 0807        Component Value Date/Time    CALCIUM 9.4 08/09/2022 0807    ALKPHOS 65 08/09/2022 0807    AST 20 08/09/2022 0807    ALT 22 08/09/2022 0807    BILITOT 0.9 08/09/2022 0807    ESTGFRAFRICA >60.0 06/04/2021 0811    EGFRNONAA 64 12/02/2021 0737          Lab Results   Component Value Date    TSH 2.360 12/02/2021     No results found for: INR, PROTIME  Lab Results   Component Value Date    WBC 7.1  12/02/2021    HGB 15.4 12/02/2021    HCT 48.6 12/02/2021    MCV 90 12/02/2021     12/02/2021     BMP  Sodium   Date Value Ref Range Status   08/09/2022 140 136 - 145 mmol/L Final     Potassium   Date Value Ref Range Status   08/09/2022 4.8 3.5 - 5.1 mmol/L Final     Chloride   Date Value Ref Range Status   08/09/2022 107 95 - 110 mmol/L Final     CO2   Date Value Ref Range Status   08/09/2022 28 23 - 29 mmol/L Final     BUN   Date Value Ref Range Status   08/09/2022 18 8 - 23 mg/dL Final     Creatinine   Date Value Ref Range Status   08/09/2022 1.1 0.5 - 1.4 mg/dL Final     Calcium   Date Value Ref Range Status   08/09/2022 9.4 8.7 - 10.5 mg/dL Final     Anion Gap   Date Value Ref Range Status   08/09/2022 5 (L) 8 - 16 mmol/L Final     eGFR if    Date Value Ref Range Status   06/04/2021 >60.0 >60 mL/min/1.73 m^2 Final     eGFR if non    Date Value Ref Range Status   12/02/2021 64 >59 mL/min/1.73 Final     CrCl cannot be calculated (Patient's most recent lab result is older than the maximum 7 days allowed.).    Assessment:     1. Coronary artery disease involving native coronary artery of native heart without angina pectoris    2. Atherosclerosis of native coronary artery of native heart without angina pectoris    3. Abnormal stress test    4. Other hyperlipidemia    5. Primary hypertension      Cad asymptomatic good exercise tolerance with lipids on target compliant with medical some orthostatic dizziness. counseled about hydration.  ekg shows pac no arrythmias clinically he is using his cpap no sleep apnea clinically.  htn controlled. continue same    lipids on tarrget continue same therapy.    Plan:   Continue current therapy  Cardiac low salt diet.  Risk factor modification and excercise program./weight loss  F/u in 6 months with lipid cmp lexiscan .

## 2022-08-30 ENCOUNTER — TELEPHONE (OUTPATIENT)
Dept: CARDIOLOGY | Facility: HOSPITAL | Age: 67
End: 2022-08-30
Payer: MEDICARE

## 2022-12-15 DIAGNOSIS — E78.49 OTHER HYPERLIPIDEMIA: ICD-10-CM

## 2022-12-15 RX ORDER — ROSUVASTATIN CALCIUM 20 MG/1
20 TABLET, COATED ORAL NIGHTLY
Qty: 90 TABLET | Refills: 1 | Status: SHIPPED | OUTPATIENT
Start: 2022-12-15 | End: 2023-06-06

## 2023-01-19 ENCOUNTER — LAB VISIT (OUTPATIENT)
Dept: LAB | Facility: HOSPITAL | Age: 68
End: 2023-01-19
Attending: INTERNAL MEDICINE
Payer: COMMERCIAL

## 2023-01-19 DIAGNOSIS — I25.10 CORONARY ARTERY DISEASE INVOLVING NATIVE CORONARY ARTERY OF NATIVE HEART WITHOUT ANGINA PECTORIS: ICD-10-CM

## 2023-01-19 DIAGNOSIS — R94.39 ABNORMAL STRESS TEST: ICD-10-CM

## 2023-01-19 PROCEDURE — 80053 COMPREHEN METABOLIC PANEL: CPT | Performed by: INTERNAL MEDICINE

## 2023-01-19 PROCEDURE — 36415 COLL VENOUS BLD VENIPUNCTURE: CPT | Mod: PO | Performed by: INTERNAL MEDICINE

## 2023-01-19 PROCEDURE — 80061 LIPID PANEL: CPT | Performed by: INTERNAL MEDICINE

## 2023-01-20 ENCOUNTER — HOSPITAL ENCOUNTER (OUTPATIENT)
Dept: RADIOLOGY | Facility: HOSPITAL | Age: 68
Discharge: HOME | End: 2023-01-20
Attending: INTERNAL MEDICINE
Payer: MEDICARE

## 2023-01-20 ENCOUNTER — HOSPITAL ENCOUNTER (OUTPATIENT)
Dept: CARDIOLOGY | Facility: HOSPITAL | Age: 68
Discharge: HOME | End: 2023-01-20
Attending: INTERNAL MEDICINE
Payer: MEDICARE

## 2023-01-20 DIAGNOSIS — R94.31 ABNORMAL ELECTROCARDIOGRAM (ECG) (EKG): ICD-10-CM

## 2023-01-20 DIAGNOSIS — R94.39 ABNORMAL STRESS TEST: ICD-10-CM

## 2023-01-20 DIAGNOSIS — I25.10 CORONARY ARTERY DISEASE INVOLVING NATIVE CORONARY ARTERY OF NATIVE HEART WITHOUT ANGINA PECTORIS: ICD-10-CM

## 2023-01-20 LAB
ALBUMIN SERPL BCP-MCNC: 4.5 G/DL (ref 3.5–5.2)
ALP SERPL-CCNC: 81 U/L (ref 55–135)
ALT SERPL W/O P-5'-P-CCNC: 34 U/L (ref 10–44)
ANION GAP SERPL CALC-SCNC: 10 MMOL/L (ref 8–16)
AST SERPL-CCNC: 28 U/L (ref 10–40)
BILIRUB SERPL-MCNC: 0.9 MG/DL (ref 0.1–1)
BUN SERPL-MCNC: 16 MG/DL (ref 8–23)
CALCIUM SERPL-MCNC: 9.8 MG/DL (ref 8.7–10.5)
CHLORIDE SERPL-SCNC: 106 MMOL/L (ref 95–110)
CHOLEST SERPL-MCNC: 112 MG/DL (ref 120–199)
CHOLEST/HDLC SERPL: 2.7 {RATIO} (ref 2–5)
CO2 SERPL-SCNC: 22 MMOL/L (ref 23–29)
CREAT SERPL-MCNC: 1.2 MG/DL (ref 0.5–1.4)
CV STRESS BASE HR: 61 BPM
DIASTOLIC BLOOD PRESSURE: 87 MMHG
EST. GFR  (NO RACE VARIABLE): >60 ML/MIN/1.73 M^2
GLUCOSE SERPL-MCNC: 99 MG/DL (ref 70–110)
HDLC SERPL-MCNC: 41 MG/DL (ref 40–75)
HDLC SERPL: 36.6 % (ref 20–50)
LDLC SERPL CALC-MCNC: 53.8 MG/DL (ref 63–159)
NONHDLC SERPL-MCNC: 71 MG/DL
NUC REST EJECTION FRACTION: 68
NUC STRESS EJECTION FRACTION: 71 %
OHS CV CPX 85 PERCENT MAX PREDICTED HEART RATE MALE: 131
OHS CV CPX ESTIMATED METS: 1
OHS CV CPX MAX PREDICTED HEART RATE: 154
OHS CV CPX PATIENT IS FEMALE: 0
OHS CV CPX PATIENT IS MALE: 1
OHS CV CPX PEAK DIASTOLIC BLOOD PRESSURE: 73 MMHG
OHS CV CPX PEAK HEAR RATE: 79 BPM
OHS CV CPX PEAK RATE PRESSURE PRODUCT: 8611
OHS CV CPX PEAK SYSTOLIC BLOOD PRESSURE: 109 MMHG
OHS CV CPX PERCENT MAX PREDICTED HEART RATE ACHIEVED: 51
OHS CV CPX RATE PRESSURE PRODUCT PRESENTING: 7564
POTASSIUM SERPL-SCNC: 5.4 MMOL/L (ref 3.5–5.1)
PROT SERPL-MCNC: 7.4 G/DL (ref 6–8.4)
SODIUM SERPL-SCNC: 138 MMOL/L (ref 136–145)
STRESS ECHO POST EXERCISE DUR SEC: 55 SECONDS
SYSTOLIC BLOOD PRESSURE: 124 MMHG
TRIGL SERPL-MCNC: 86 MG/DL (ref 30–150)

## 2023-01-20 PROCEDURE — 63600175 PHARM REV CODE 636 W HCPCS: Performed by: INTERNAL MEDICINE

## 2023-01-20 PROCEDURE — 93016 CV STRESS TEST SUPVJ ONLY: CPT | Mod: ,,, | Performed by: INTERNAL MEDICINE

## 2023-01-20 PROCEDURE — 93018 CV STRESS TEST I&R ONLY: CPT | Mod: ,,, | Performed by: INTERNAL MEDICINE

## 2023-01-20 PROCEDURE — 78452 HT MUSCLE IMAGE SPECT MULT: CPT

## 2023-01-20 PROCEDURE — 93016 STRESS TEST WITH MYOCARDIAL PERFUSION (CUPID ONLY): ICD-10-PCS | Mod: ,,, | Performed by: INTERNAL MEDICINE

## 2023-01-20 PROCEDURE — 78452 STRESS TEST WITH MYOCARDIAL PERFUSION (CUPID ONLY): ICD-10-PCS | Mod: 26,,, | Performed by: INTERNAL MEDICINE

## 2023-01-20 PROCEDURE — A9502 TC99M TETROFOSMIN: HCPCS

## 2023-01-20 PROCEDURE — 93017 CV STRESS TEST TRACING ONLY: CPT

## 2023-01-20 PROCEDURE — 78452 HT MUSCLE IMAGE SPECT MULT: CPT | Mod: 26,,, | Performed by: INTERNAL MEDICINE

## 2023-01-20 PROCEDURE — 93018 STRESS TEST WITH MYOCARDIAL PERFUSION (CUPID ONLY): ICD-10-PCS | Mod: ,,, | Performed by: INTERNAL MEDICINE

## 2023-01-20 RX ORDER — REGADENOSON 0.08 MG/ML
0.4 INJECTION, SOLUTION INTRAVENOUS ONCE
Status: COMPLETED | OUTPATIENT
Start: 2023-01-20 | End: 2023-01-20

## 2023-01-20 RX ADMIN — REGADENOSON 0.4 MG: 0.08 INJECTION, SOLUTION INTRAVENOUS at 10:01

## 2023-01-23 ENCOUNTER — TELEPHONE (OUTPATIENT)
Dept: CARDIOLOGY | Facility: CLINIC | Age: 68
End: 2023-01-23
Payer: COMMERCIAL

## 2023-01-23 NOTE — TELEPHONE ENCOUNTER
The patient has been notified of this information and all questions answered.          ----- Message from Raymond Yanes MD sent at 1/20/2023  6:25 PM CST -----  Stress test negative

## 2023-01-26 ENCOUNTER — OFFICE VISIT (OUTPATIENT)
Dept: CARDIOLOGY | Facility: CLINIC | Age: 68
End: 2023-01-26
Payer: MEDICARE

## 2023-01-26 VITALS
HEART RATE: 58 BPM | DIASTOLIC BLOOD PRESSURE: 72 MMHG | BODY MASS INDEX: 34.94 KG/M2 | OXYGEN SATURATION: 97 % | SYSTOLIC BLOOD PRESSURE: 120 MMHG | WEIGHT: 223.13 LBS

## 2023-01-26 DIAGNOSIS — I10 PRIMARY HYPERTENSION: ICD-10-CM

## 2023-01-26 DIAGNOSIS — R94.39 ABNORMAL STRESS TEST: ICD-10-CM

## 2023-01-26 DIAGNOSIS — E78.49 OTHER HYPERLIPIDEMIA: ICD-10-CM

## 2023-01-26 DIAGNOSIS — I25.10 CORONARY ARTERY DISEASE INVOLVING NATIVE CORONARY ARTERY OF NATIVE HEART WITHOUT ANGINA PECTORIS: Primary | ICD-10-CM

## 2023-01-26 DIAGNOSIS — I25.10 ATHEROSCLEROSIS OF NATIVE CORONARY ARTERY OF NATIVE HEART WITHOUT ANGINA PECTORIS: ICD-10-CM

## 2023-01-26 PROCEDURE — 99213 OFFICE O/P EST LOW 20 MIN: CPT | Mod: S$PBB,,, | Performed by: INTERNAL MEDICINE

## 2023-01-26 PROCEDURE — 99999 PR PBB SHADOW E&M-EST. PATIENT-LVL III: ICD-10-PCS | Mod: PBBFAC,,, | Performed by: INTERNAL MEDICINE

## 2023-01-26 PROCEDURE — 99213 PR OFFICE/OUTPT VISIT, EST, LEVL III, 20-29 MIN: ICD-10-PCS | Mod: S$PBB,,, | Performed by: INTERNAL MEDICINE

## 2023-01-26 PROCEDURE — 99999 PR PBB SHADOW E&M-EST. PATIENT-LVL III: CPT | Mod: PBBFAC,,, | Performed by: INTERNAL MEDICINE

## 2023-01-26 PROCEDURE — 99213 OFFICE O/P EST LOW 20 MIN: CPT | Mod: PBBFAC | Performed by: INTERNAL MEDICINE

## 2023-01-26 NOTE — PROGRESS NOTES
Subjective:   Patient ID:  Chino Manzano is a 67 y.o. male who presents for follow up of No chief complaint on file.      HPI  The patient came in today for cardiac consult of Follow-up     Prior visit  . Chino had a calcium score that showed some moderate calcification that prompted a stress test where he went 12 minutes of the Jad protocol and had some EKG changes with no symptoms. He subsequently underwent cardiac catheterization on June 6 by Dr. Kamar Harry that demonstrated 2 vessel coronary artery disease. He was subsequently recommended to have bypass surgery but was told that he had a poor target in his LAD then sought a second opinion by Dr. Gil at [facility]. Dr. Gil ordered a nuclear perfusion study that was normal. It is unclear whether he made a full recommendation for any type of stenting but once again the patient went 9 minutes of the Jad protocol on the nuclear perfusion study without symptoms and with normal myocardial perfusion. He presently denies any angina he is on appropriate medical therapy I discussed the cardiac catheterization films which I reviewed personally I went over the nuclear perfusion study with him and recommended continued medical therapy at the present time because he has non-left main non-LAD disease and a poor LAD target without angina pectoris and with a normal perfusion study at a high workload.           6/12/2020  A 65 Yo male with cad medically treated with negative cardiolite fairly active exercises regularily has no angina or shortness of breath tries to be compliant with diet weight unchanged.has still no cardiovascular complaints. No change in status since last visit in December.he is being more liberal with his evening snacks. Rasing his ldl.has normal ekg with pac. He pl;ays javi asymptomatic cuts grass with push mower asymptomatic.     12/17/2020  Asymptomatic exercising eats more but has increased exercise no new complaints. His lvf is normal his  stress test good exercise tolerance   lvf normal by echo he has no angina. He thinks he had the virus since his wife had it and he had cough and loss sense of smell and taste.      6/10/20/21   he has no change in status he is exercising however eating a lot late at nite he cuts the grass . He is completely asymptomatic   Patient feels no chest pain, no sob, no leg swelling, no PND, no palpitation, no dizziness, no syncope, no CNS symptoms.     Patient has fairly good exercise tolerance.     Patient is compliant with medications.     Results for orders placed during the hospital encounter of 12/11/20     Nuclear Stress - Cardiology Interpreted     Interpretation Summary    Normal myocardial perfusion scan. There is no evidence of myocardial ischemia or infarction.    Gated perfusion images showed an ejection fraction of 58% at rest and 65%.    There is normal wall motion at rest and post stress.    LV cavity size is normal at rest and normal at stress.    The EKG portion of this study is positive for ischemia.    The patient reported no chest pain during the stress test.    The exercise capacity was above average.     Results for orders placed during the hospital encounter of 12/11/20     Echo Color Flow Doppler? Yes     Interpretation Summary  · There is left ventricular concentric remodeling.  · With normal systolic function. The estimated ejection fraction is 60%.  · Normal left ventricular diastolic function.  · Normal right ventricular size with normal right ventricular systolic function.        12/13/2021   Not walking as much still plays [employer] exercises has no new complainsts he is going snow skiing. Has gained few lbs has some scapular pain that seems musculoskeletal in nature . He is asymptomatic cardiac wise intermittent lightheadedness positional   Lipid ok although increased. Still ldl on target     8/23/2022   has no new complaints still active [employer] exercise. Tenzin botello has been compliant  with meds     1/26/2023   Here for f/u he is asymptomatic exercises regularily he was in [place] HE WALKED A LOT . Denies any angina. Has no chf symptoms no arrythmias. He is compliant with diet  Has plantar fascitis he is not able to walk.      Past Medical History:   Diagnosis Date    Coronary artery disease     Heart murmur     Hypercholesterolemia     Hyperlipidemia     Hypertension     Insulin resistance syndrome     Mumps     childhood    MARCIA (obstructive sleep apnea)        Past Surgical History:   Procedure Laterality Date    CARDIAC CATHETERIZATION         Social History     Tobacco Use    Smoking status: Never    Smokeless tobacco: Never   Substance Use Topics    Alcohol use: Yes     Comment: occasional    Drug use: No       Family History   Problem Relation Age of Onset    Heart attack Brother 59    Heart attack Maternal Grandfather 65    Heart attack Paternal Grandfather 45    Hypertension Brother     Glaucoma Mother     Cancer Father     Stroke Neg Hx        Current Outpatient Medications   Medication Sig    aspirin (ECOTRIN) 81 MG EC tablet Take 81 mg by mouth once daily.    azithromycin (Z-JANEEN) 250 MG tablet Take 2 tablets by mouth on day 1; Take 1 tablet by mouth on days 2-5    nebivoloL (BYSTOLIC) 5 MG Tab TAKE ONE TABLET BY MOUTH EVERY DAY    ramipriL (ALTACE) 2.5 MG capsule TAKE ONE CAPSULE ONCE DAILY    rosuvastatin (CRESTOR) 20 MG tablet Take 1 tablet (20 mg total) by mouth every evening.     No current facility-administered medications for this visit.     Current Outpatient Medications on File Prior to Visit   Medication Sig    aspirin (ECOTRIN) 81 MG EC tablet Take 81 mg by mouth once daily.    azithromycin (Z-JANEEN) 250 MG tablet Take 2 tablets by mouth on day 1; Take 1 tablet by mouth on days 2-5    nebivoloL (BYSTOLIC) 5 MG Tab TAKE ONE TABLET BY MOUTH EVERY DAY    ramipriL (ALTACE) 2.5 MG capsule TAKE ONE CAPSULE ONCE DAILY    rosuvastatin (CRESTOR) 20 MG tablet Take 1 tablet (20 mg total) by  mouth every evening.     No current facility-administered medications on file prior to visit.     Review of patient's allergies indicates:  No Known Allergies   Review of Systems   Constitutional: Negative for malaise/fatigue.   Eyes:  Negative for blurred vision.   Cardiovascular:  Negative for chest pain, claudication, cyanosis, dyspnea on exertion, irregular heartbeat, leg swelling, near-syncope, orthopnea, palpitations and paroxysmal nocturnal dyspnea.   Respiratory:  Negative for cough, hemoptysis and shortness of breath.    Hematologic/Lymphatic: Negative for bleeding problem. Does not bruise/bleed easily.   Skin:  Negative for dry skin and itching.   Musculoskeletal:  Negative for falls, muscle weakness and myalgias.   Gastrointestinal:  Negative for abdominal pain, diarrhea, heartburn, hematemesis, hematochezia and melena.   Genitourinary:  Negative for flank pain and hematuria.   Neurological:  Negative for dizziness, focal weakness, headaches, light-headedness, numbness, paresthesias, seizures and weakness.   Psychiatric/Behavioral:  Negative for altered mental status and memory loss. The patient is not nervous/anxious.    Allergic/Immunologic: Negative for hives.     Objective:   Physical Exam  Vitals and nursing note reviewed.   Constitutional:       General: He is not in acute distress.     Appearance: He is well-developed. He is not diaphoretic.   HENT:      Head: Normocephalic and atraumatic.   Eyes:      General:         Right eye: No discharge.         Left eye: No discharge.      Pupils: Pupils are equal, round, and reactive to light.   Neck:      Thyroid: No thyromegaly.      Vascular: No JVD.   Cardiovascular:      Rate and Rhythm: Normal rate and regular rhythm.      Pulses: Normal pulses and intact distal pulses.      Heart sounds: Normal heart sounds. No murmur heard.    No friction rub. No gallop.   Pulmonary:      Effort: Pulmonary effort is normal. No respiratory distress.      Breath  sounds: Normal breath sounds. No wheezing or rales.   Chest:      Chest wall: No tenderness.   Abdominal:      General: Bowel sounds are normal. There is no distension.      Palpations: Abdomen is soft.      Tenderness: There is no abdominal tenderness.   Musculoskeletal:         General: Normal range of motion.      Cervical back: Neck supple.   Skin:     General: Skin is warm and dry.      Findings: No erythema or rash.   Neurological:      Mental Status: He is alert and oriented to person, place, and time.      Cranial Nerves: No cranial nerve deficit.   Psychiatric:         Behavior: Behavior normal.     Vitals:    01/26/23 1448   BP: 120/72   Pulse: (!) 58   SpO2: 97%   Weight: 101.2 kg (223 lb 1.7 oz)     Lab Results   Component Value Date    CHOL 112 (L) 01/19/2023    CHOL 113 (L) 08/09/2022    CHOL 129 12/02/2021     Lab Results   Component Value Date    HDL 41 01/19/2023    HDL 42 08/09/2022    HDL 45 12/02/2021     Lab Results   Component Value Date    LDLCALC 53.8 (L) 01/19/2023    LDLCALC 52.8 (L) 08/09/2022    LDLCALC 66 12/02/2021     Lab Results   Component Value Date    TRIG 86 01/19/2023    TRIG 91 08/09/2022    TRIG 96 12/02/2021     Lab Results   Component Value Date    CHOLHDL 36.6 01/19/2023    CHOLHDL 37.2 08/09/2022    CHOLHDL 44.0 06/04/2021       Chemistry        Component Value Date/Time     01/19/2023 0836    K 5.4 (H) 01/19/2023 0836     01/19/2023 0836    CO2 22 (L) 01/19/2023 0836    BUN 16 01/19/2023 0836    CREATININE 1.2 01/19/2023 0836    GLU 99 01/19/2023 0836        Component Value Date/Time    CALCIUM 9.8 01/19/2023 0836    ALKPHOS 81 01/19/2023 0836    AST 28 01/19/2023 0836    ALT 34 01/19/2023 0836    BILITOT 0.9 01/19/2023 0836    ESTGFRAFRICA >60.0 06/04/2021 0811    EGFRNONAA 64 12/02/2021 0737          Lab Results   Component Value Date    TSH 2.360 12/02/2021     No results found for: INR, PROTIME  Lab Results   Component Value Date    WBC 7.1 12/02/2021     HGB 15.4 12/02/2021    HCT 48.6 12/02/2021    MCV 90 12/02/2021     12/02/2021     BMP  Sodium   Date Value Ref Range Status   01/19/2023 138 136 - 145 mmol/L Final     Potassium   Date Value Ref Range Status   01/19/2023 5.4 (H) 3.5 - 5.1 mmol/L Final     Chloride   Date Value Ref Range Status   01/19/2023 106 95 - 110 mmol/L Final     CO2   Date Value Ref Range Status   01/19/2023 22 (L) 23 - 29 mmol/L Final     BUN   Date Value Ref Range Status   01/19/2023 16 8 - 23 mg/dL Final     Creatinine   Date Value Ref Range Status   01/19/2023 1.2 0.5 - 1.4 mg/dL Final     Calcium   Date Value Ref Range Status   01/19/2023 9.8 8.7 - 10.5 mg/dL Final     Anion Gap   Date Value Ref Range Status   01/19/2023 10 8 - 16 mmol/L Final     eGFR if    Date Value Ref Range Status   06/04/2021 >60.0 >60 mL/min/1.73 m^2 Final     eGFR if non    Date Value Ref Range Status   12/02/2021 64 >59 mL/min/1.73 Final     CrCl cannot be calculated (Patient's most recent lab result is older than the maximum 7 days allowed.).  Conclusion         Normal myocardial perfusion scan. There is no evidence of myocardial ischemia or infarction.    There is a mild intensity fixed perfusion abnormality in the inferolateral wall of the left ventricle, secondary to soft tissue attenuation.    The gated perfusion images showed an ejection fraction of 68% at rest. The gated perfusion images showed an ejection fraction of 71% post stress.    There is normal wall motion at rest and post stress.  Assessment:     1. Coronary artery disease involving native coronary artery of native heart without angina pectoris    2. Abnormal stress test    3. Atherosclerosis of native coronary artery of native heart without angina pectoris    4. Other hyperlipidemia    5. Primary hypertension      Asymptomatic on appropriate therapy with lipids on target negative cardiolite his issue is really obesity  the need to lose weight and exercise  hoping to achiev that after he addressed hiss foot issue./  Plan:   Continue current therapy  Cardiac low salt diet.  Risk factor modification and excercise program./weight loss  F/u in 6 months with lipid cmp

## 2023-04-11 DIAGNOSIS — I10 ESSENTIAL HYPERTENSION: ICD-10-CM

## 2023-04-11 RX ORDER — NEBIVOLOL 5 MG/1
5 TABLET ORAL DAILY
Qty: 30 TABLET | Refills: 5 | Status: SHIPPED | OUTPATIENT
Start: 2023-04-11 | End: 2023-10-02

## 2023-06-05 DIAGNOSIS — E78.49 OTHER HYPERLIPIDEMIA: ICD-10-CM

## 2023-06-06 RX ORDER — ROSUVASTATIN CALCIUM 20 MG/1
TABLET, COATED ORAL
Qty: 90 TABLET | Refills: 1 | Status: SHIPPED | OUTPATIENT
Start: 2023-06-06 | End: 2023-12-15

## 2023-06-08 DIAGNOSIS — I10 ESSENTIAL HYPERTENSION: ICD-10-CM

## 2023-06-08 RX ORDER — RAMIPRIL 2.5 MG/1
CAPSULE ORAL
Qty: 90 CAPSULE | Refills: 1 | Status: SHIPPED | OUTPATIENT
Start: 2023-06-08 | End: 2023-12-04

## 2023-06-23 DIAGNOSIS — I25.10 CORONARY ARTERY DISEASE INVOLVING NATIVE CORONARY ARTERY OF NATIVE HEART WITHOUT ANGINA PECTORIS: Primary | ICD-10-CM

## 2023-07-19 ENCOUNTER — LAB VISIT (OUTPATIENT)
Dept: LAB | Facility: HOSPITAL | Age: 68
End: 2023-07-19
Attending: INTERNAL MEDICINE
Payer: MEDICARE

## 2023-07-19 DIAGNOSIS — I25.10 CORONARY ARTERY DISEASE INVOLVING NATIVE CORONARY ARTERY OF NATIVE HEART WITHOUT ANGINA PECTORIS: ICD-10-CM

## 2023-07-19 DIAGNOSIS — E78.49 OTHER HYPERLIPIDEMIA: ICD-10-CM

## 2023-07-19 LAB
ALBUMIN SERPL BCP-MCNC: 4.1 G/DL (ref 3.5–5.2)
ALP SERPL-CCNC: 67 U/L (ref 55–135)
ALT SERPL W/O P-5'-P-CCNC: 22 U/L (ref 10–44)
ANION GAP SERPL CALC-SCNC: 7 MMOL/L (ref 8–16)
AST SERPL-CCNC: 25 U/L (ref 10–40)
BILIRUB SERPL-MCNC: 0.8 MG/DL (ref 0.1–1)
BUN SERPL-MCNC: 15 MG/DL (ref 8–23)
CALCIUM SERPL-MCNC: 9.1 MG/DL (ref 8.7–10.5)
CHLORIDE SERPL-SCNC: 107 MMOL/L (ref 95–110)
CHOLEST SERPL-MCNC: 96 MG/DL (ref 120–199)
CHOLEST/HDLC SERPL: 2.7 {RATIO} (ref 2–5)
CO2 SERPL-SCNC: 27 MMOL/L (ref 23–29)
CREAT SERPL-MCNC: 1.1 MG/DL (ref 0.5–1.4)
EST. GFR  (NO RACE VARIABLE): >60 ML/MIN/1.73 M^2
GLUCOSE SERPL-MCNC: 90 MG/DL (ref 70–110)
HDLC SERPL-MCNC: 36 MG/DL (ref 40–75)
HDLC SERPL: 37.5 % (ref 20–50)
LDLC SERPL CALC-MCNC: 37.6 MG/DL (ref 63–159)
NONHDLC SERPL-MCNC: 60 MG/DL
POTASSIUM SERPL-SCNC: 5.1 MMOL/L (ref 3.5–5.1)
PROT SERPL-MCNC: 7 G/DL (ref 6–8.4)
SODIUM SERPL-SCNC: 141 MMOL/L (ref 136–145)
TRIGL SERPL-MCNC: 112 MG/DL (ref 30–150)

## 2023-07-19 PROCEDURE — 36415 COLL VENOUS BLD VENIPUNCTURE: CPT | Mod: PO | Performed by: INTERNAL MEDICINE

## 2023-07-19 PROCEDURE — 80061 LIPID PANEL: CPT | Performed by: INTERNAL MEDICINE

## 2023-07-19 PROCEDURE — 80053 COMPREHEN METABOLIC PANEL: CPT | Performed by: INTERNAL MEDICINE

## 2023-07-26 ENCOUNTER — HOSPITAL ENCOUNTER (OUTPATIENT)
Dept: CARDIOLOGY | Facility: HOSPITAL | Age: 68
Discharge: HOME | End: 2023-07-26
Attending: INTERNAL MEDICINE
Payer: MEDICARE

## 2023-07-26 ENCOUNTER — OFFICE VISIT (OUTPATIENT)
Dept: CARDIOLOGY | Facility: CLINIC | Age: 68
End: 2023-07-26
Payer: MEDICARE

## 2023-07-26 VITALS
WEIGHT: 225.06 LBS | HEIGHT: 67 IN | BODY MASS INDEX: 35.33 KG/M2 | HEART RATE: 51 BPM | DIASTOLIC BLOOD PRESSURE: 76 MMHG | OXYGEN SATURATION: 95 % | SYSTOLIC BLOOD PRESSURE: 120 MMHG

## 2023-07-26 VITALS — BODY MASS INDEX: 35.25 KG/M2 | WEIGHT: 225.06 LBS

## 2023-07-26 DIAGNOSIS — R94.39 ABNORMAL STRESS TEST: ICD-10-CM

## 2023-07-26 DIAGNOSIS — E78.49 OTHER HYPERLIPIDEMIA: ICD-10-CM

## 2023-07-26 DIAGNOSIS — I25.10 CORONARY ARTERY DISEASE INVOLVING NATIVE CORONARY ARTERY OF NATIVE HEART WITHOUT ANGINA PECTORIS: ICD-10-CM

## 2023-07-26 DIAGNOSIS — I25.10 ATHEROSCLEROSIS OF NATIVE CORONARY ARTERY OF NATIVE HEART WITHOUT ANGINA PECTORIS: Primary | ICD-10-CM

## 2023-07-26 DIAGNOSIS — I10 PRIMARY HYPERTENSION: ICD-10-CM

## 2023-07-26 PROCEDURE — 93010 EKG 12-LEAD: ICD-10-PCS | Mod: ,,, | Performed by: INTERNAL MEDICINE

## 2023-07-26 PROCEDURE — 99999 PR PBB SHADOW E&M-EST. PATIENT-LVL III: ICD-10-PCS | Mod: PBBFAC,,, | Performed by: INTERNAL MEDICINE

## 2023-07-26 PROCEDURE — 93010 ELECTROCARDIOGRAM REPORT: CPT | Mod: ,,, | Performed by: INTERNAL MEDICINE

## 2023-07-26 PROCEDURE — 99213 PR OFFICE/OUTPT VISIT, EST, LEVL III, 20-29 MIN: ICD-10-PCS | Mod: S$PBB,,, | Performed by: INTERNAL MEDICINE

## 2023-07-26 PROCEDURE — 99999 PR PBB SHADOW E&M-EST. PATIENT-LVL III: CPT | Mod: PBBFAC,,, | Performed by: INTERNAL MEDICINE

## 2023-07-26 PROCEDURE — 93005 ELECTROCARDIOGRAM TRACING: CPT

## 2023-07-26 PROCEDURE — 99213 OFFICE O/P EST LOW 20 MIN: CPT | Mod: PBBFAC | Performed by: INTERNAL MEDICINE

## 2023-07-26 PROCEDURE — 99213 OFFICE O/P EST LOW 20 MIN: CPT | Mod: S$PBB,,, | Performed by: INTERNAL MEDICINE

## 2023-07-26 NOTE — PROGRESS NOTES
Subjective:   Patient ID:  Chino Manzano is a 67 y.o. male who presents for follow up of No chief complaint on file.      HPI  The patient came in today for cardiac consult of Follow-up     Prior visit  . Chino had a calcium score that showed some moderate calcification that prompted a stress test where he went 12 minutes of the Jad protocol and had some EKG changes with no symptoms. He subsequently underwent cardiac catheterization on June 6 by Dr. Kamar Harry that demonstrated 2 vessel coronary artery disease. He was subsequently recommended to have bypass surgery but was told that he had a poor target in his LAD then sought a second opinion by Dr. Gil at [facility]. Dr. Gil ordered a nuclear perfusion study that was normal. It is unclear whether he made a full recommendation for any type of stenting but once again the patient went 9 minutes of the Jad protocol on the nuclear perfusion study without symptoms and with normal myocardial perfusion. He presently denies any angina he is on appropriate medical therapy I discussed the cardiac catheterization films which I reviewed personally I went over the nuclear perfusion study with him and recommended continued medical therapy at the present time because he has non-left main non-LAD disease and a poor LAD target without angina pectoris and with a normal perfusion study at a high workload.           6/12/2020  A 63 Yo male with cad medically treated with negative cardiolite fairly active exercises regularily has no angina or shortness of breath tries to be compliant with diet weight unchanged.has still no cardiovascular complaints. No change in status since last visit in December.he is being more liberal with his evening snacks. Rasing his ldl.has normal ekg with pac. He pl;ays javi asymptomatic cuts grass with push mower asymptomatic.     12/17/2020  Asymptomatic exercising eats more but has increased exercise no new complaints. His lvf is normal his  stress test good exercise tolerance   lvf normal by echo he has no angina. He thinks he had the virus since his wife had it and he had cough and loss sense of smell and taste.      6/10/20/21   he has no change in status he is exercising however eating a lot late at nite he cuts the grass . He is completely asymptomatic   Patient feels no chest pain, no sob, no leg swelling, no PND, no palpitation, no dizziness, no syncope, no CNS symptoms.     Patient has fairly good exercise tolerance.     Patient is compliant with medications.     Results for orders placed during the hospital encounter of 12/11/20     Nuclear Stress - Cardiology Interpreted     Interpretation Summary    Normal myocardial perfusion scan. There is no evidence of myocardial ischemia or infarction.    Gated perfusion images showed an ejection fraction of 58% at rest and 65%.    There is normal wall motion at rest and post stress.    LV cavity size is normal at rest and normal at stress.    The EKG portion of this study is positive for ischemia.    The patient reported no chest pain during the stress test.    The exercise capacity was above average.     Results for orders placed during the hospital encounter of 12/11/20     Echo Color Flow Doppler? Yes     Interpretation Summary  · There is left ventricular concentric remodeling.  · With normal systolic function. The estimated ejection fraction is 60%.  · Normal left ventricular diastolic function.  · Normal right ventricular size with normal right ventricular systolic function.        12/13/2021   Not walking as much still plays [employer] exercises has no new complainsts he is going snow skiing. Has gained few lbs has some scapular pain that seems musculoskeletal in nature . He is asymptomatic cardiac wise intermittent lightheadedness positional   Lipid ok although increased. Still ldl on target     8/23/2022   has no new complaints still active [employer] exercise. Tenzin botello has been compliant  with meds      1/26/2023   Here for f/u he is asymptomatic exercises regularily he was in [place] HE WALKED A LOT . Denies any angina. Has no chf symptoms no arrythmias. He is compliant with diet  Has plantar fascitis he is not able to walk.     7/26/2023  Here forf /u has been limited with exercise due to ankle injury has not been able to exercise. Denies any symptoms.  Lipids on target . He cuts the grass no issues clinically.  Past Medical History:   Diagnosis Date    Coronary artery disease     Heart murmur     Hypercholesterolemia     Hyperlipidemia     Hypertension     Insulin resistance syndrome     Mumps     childhood    MARCIA (obstructive sleep apnea)        Past Surgical History:   Procedure Laterality Date    CARDIAC CATHETERIZATION         Social History     Tobacco Use    Smoking status: Never    Smokeless tobacco: Never   Substance Use Topics    Alcohol use: Yes     Comment: occasional    Drug use: No       Family History   Problem Relation Age of Onset    Heart attack Brother 59    Heart attack Maternal Grandfather 65    Heart attack Paternal Grandfather 45    Hypertension Brother     Glaucoma Mother     Cancer Father     Stroke Neg Hx        Current Outpatient Medications   Medication Sig    aspirin (ECOTRIN) 81 MG EC tablet Take 81 mg by mouth once daily.    nebivoloL (BYSTOLIC) 5 MG Tab Take 1 tablet (5 mg total) by mouth once daily.    ramipriL (ALTACE) 2.5 MG capsule TAKE ONE CAPSULE ONCE DAILY    rosuvastatin (CRESTOR) 20 MG tablet TAKE ONE TABLET BY MOUTH EVERY EVENING    amoxicillin-clavulanate 875-125mg (AUGMENTIN) 875-125 mg per tablet Take 1 tablet by mouth every 12 (twelve) hours.     No current facility-administered medications for this visit.     Current Outpatient Medications on File Prior to Visit   Medication Sig    aspirin (ECOTRIN) 81 MG EC tablet Take 81 mg by mouth once daily.    nebivoloL (BYSTOLIC) 5 MG Tab Take 1 tablet (5 mg total) by mouth once daily.    ramipriL (ALTACE) 2.5 MG  capsule TAKE ONE CAPSULE ONCE DAILY    rosuvastatin (CRESTOR) 20 MG tablet TAKE ONE TABLET BY MOUTH EVERY EVENING    amoxicillin-clavulanate 875-125mg (AUGMENTIN) 875-125 mg per tablet Take 1 tablet by mouth every 12 (twelve) hours.     No current facility-administered medications on file prior to visit.     Review of patient's allergies indicates:  No Known Allergies   Review of Systems   Constitutional: Positive for weight gain. Negative for malaise/fatigue.   Eyes:  Negative for blurred vision.   Cardiovascular:  Negative for chest pain, claudication, cyanosis, dyspnea on exertion, irregular heartbeat, leg swelling, near-syncope, orthopnea, palpitations and paroxysmal nocturnal dyspnea.   Respiratory:  Negative for cough, hemoptysis and shortness of breath.    Hematologic/Lymphatic: Negative for bleeding problem. Does not bruise/bleed easily.   Skin:  Negative for dry skin and itching.   Musculoskeletal:  Negative for falls, muscle weakness and myalgias.   Gastrointestinal:  Negative for abdominal pain, diarrhea, heartburn, hematemesis, hematochezia and melena.   Genitourinary:  Negative for flank pain and hematuria.   Neurological:  Negative for dizziness, focal weakness, headaches, light-headedness, numbness, paresthesias, seizures and weakness.   Psychiatric/Behavioral:  Negative for altered mental status and memory loss. The patient is not nervous/anxious.    Allergic/Immunologic: Negative for hives.     Objective:   Physical Exam  Vitals and nursing note reviewed.   Constitutional:       General: He is not in acute distress.     Appearance: He is well-developed. He is obese. He is not diaphoretic.   HENT:      Head: Normocephalic and atraumatic.   Eyes:      General:         Right eye: No discharge.         Left eye: No discharge.      Pupils: Pupils are equal, round, and reactive to light.   Neck:      Thyroid: No thyromegaly.      Vascular: No JVD.   Cardiovascular:      Rate and Rhythm: Normal rate and  "regular rhythm.      Pulses: Intact distal pulses.      Heart sounds: Normal heart sounds. No murmur heard.    No friction rub. No gallop.   Pulmonary:      Effort: Pulmonary effort is normal. No respiratory distress.      Breath sounds: Normal breath sounds. No wheezing or rales.   Chest:      Chest wall: No tenderness.   Abdominal:      General: Bowel sounds are normal. There is no distension.      Palpations: Abdomen is soft.      Tenderness: There is no abdominal tenderness.   Musculoskeletal:         General: Normal range of motion.      Cervical back: Neck supple.      Right lower leg: No edema.      Left lower leg: No edema.   Skin:     General: Skin is warm and dry.      Findings: No erythema or rash.   Neurological:      Mental Status: He is alert and oriented to person, place, and time.      Cranial Nerves: No cranial nerve deficit.   Psychiatric:         Behavior: Behavior normal.     Vitals:    07/26/23 1557 07/26/23 1600   BP: 124/72 120/76   BP Location: Left arm Right arm   Patient Position: Sitting Sitting   BP Method: Large (Manual) Large (Manual)   Pulse: (!) 51    SpO2: 95%    Weight: 102.1 kg (225 lb 1.4 oz)    Height: 5' 7" (1.702 m)      Lab Results   Component Value Date    CHOL 96 (L) 07/19/2023    CHOL 112 (L) 01/19/2023    CHOL 113 (L) 08/09/2022      Body mass index is 35.25 kg/m².   No results found for: LABA1C, HGBA1C   BMP  Lab Results   Component Value Date     07/19/2023    K 5.1 07/19/2023     07/19/2023    CO2 27 07/19/2023    BUN 15 07/19/2023    CREATININE 1.1 07/19/2023    CALCIUM 9.1 07/19/2023    ANIONGAP 7 (L) 07/19/2023    EGFRNORACEVR >60.0 07/19/2023      Lab Results   Component Value Date    HDL 36 (L) 07/19/2023    HDL 41 01/19/2023    HDL 42 08/09/2022     Lab Results   Component Value Date    LDLCALC 37.6 (L) 07/19/2023    LDLCALC 53.8 (L) 01/19/2023    LDLCALC 52.8 (L) 08/09/2022     Lab Results   Component Value Date    TRIG 112 07/19/2023    TRIG 86 " 01/19/2023    TRIG 91 08/09/2022     Lab Results   Component Value Date    CHOLHDL 37.5 07/19/2023    CHOLHDL 36.6 01/19/2023    CHOLHDL 37.2 08/09/2022       Chemistry        Component Value Date/Time     07/19/2023 0801    K 5.1 07/19/2023 0801     07/19/2023 0801    CO2 27 07/19/2023 0801    BUN 15 07/19/2023 0801    CREATININE 1.1 07/19/2023 0801    GLU 90 07/19/2023 0801        Component Value Date/Time    CALCIUM 9.1 07/19/2023 0801    ALKPHOS 67 07/19/2023 0801    AST 25 07/19/2023 0801    ALT 22 07/19/2023 0801    BILITOT 0.8 07/19/2023 0801    ESTGFRAFRICA >60.0 06/04/2021 0811    EGFRNONAA 64 12/02/2021 0737          Lab Results   Component Value Date    TSH 2.360 12/02/2021     No results found for: INR, PROTIME  Lab Results   Component Value Date    WBC 7.1 12/02/2021    HGB 15.4 12/02/2021    HCT 48.6 12/02/2021    MCV 90 12/02/2021     12/02/2021     BMP  Sodium   Date Value Ref Range Status   07/19/2023 141 136 - 145 mmol/L Final     Potassium   Date Value Ref Range Status   07/19/2023 5.1 3.5 - 5.1 mmol/L Final     Chloride   Date Value Ref Range Status   07/19/2023 107 95 - 110 mmol/L Final     CO2   Date Value Ref Range Status   07/19/2023 27 23 - 29 mmol/L Final     BUN   Date Value Ref Range Status   07/19/2023 15 8 - 23 mg/dL Final     Creatinine   Date Value Ref Range Status   07/19/2023 1.1 0.5 - 1.4 mg/dL Final     Calcium   Date Value Ref Range Status   07/19/2023 9.1 8.7 - 10.5 mg/dL Final     Anion Gap   Date Value Ref Range Status   07/19/2023 7 (L) 8 - 16 mmol/L Final     eGFR if    Date Value Ref Range Status   06/04/2021 >60.0 >60 mL/min/1.73 m^2 Final     eGFR if non    Date Value Ref Range Status   12/02/2021 64 >59 mL/min/1.73 Final     CrCl cannot be calculated (Patient's most recent lab result is older than the maximum 7 days allowed.).    Assessment:     1. Atherosclerosis of native coronary artery of native heart without angina  pectoris    2. Abnormal stress test    3. Coronary artery disease involving native coronary artery of native heart without angina pectoris    4. Other hyperlipidemia    5. Primary hypertension      Asymptomatic cad wise stable lipids on tarrget tolerated meds well stable htn controlled needs weight loss he was counsled he will try to increase exercise .  Plan:     Continue current therapy  Cardiac low salt diet.  Risk factor modification and excercise program.  F/u in 6 months with lipid cmp

## 2023-09-30 DIAGNOSIS — I10 ESSENTIAL HYPERTENSION: ICD-10-CM

## 2023-10-02 RX ORDER — NEBIVOLOL 5 MG/1
5 TABLET ORAL
Qty: 30 TABLET | Refills: 5 | Status: SHIPPED | OUTPATIENT
Start: 2023-10-02

## 2023-12-03 DIAGNOSIS — I10 ESSENTIAL HYPERTENSION: ICD-10-CM

## 2023-12-04 RX ORDER — RAMIPRIL 2.5 MG/1
CAPSULE ORAL
Qty: 90 CAPSULE | Refills: 1 | Status: SHIPPED | OUTPATIENT
Start: 2023-12-04

## 2023-12-15 DIAGNOSIS — E78.49 OTHER HYPERLIPIDEMIA: ICD-10-CM

## 2023-12-15 RX ORDER — ROSUVASTATIN CALCIUM 20 MG/1
TABLET, COATED ORAL
Qty: 90 TABLET | Refills: 1 | Status: SHIPPED | OUTPATIENT
Start: 2023-12-15

## 2024-01-24 ENCOUNTER — LAB VISIT (OUTPATIENT)
Dept: LAB | Facility: HOSPITAL | Age: 69
End: 2024-01-24
Attending: INTERNAL MEDICINE
Payer: MEDICARE

## 2024-01-24 DIAGNOSIS — I25.10 CORONARY ARTERY DISEASE INVOLVING NATIVE CORONARY ARTERY OF NATIVE HEART WITHOUT ANGINA PECTORIS: ICD-10-CM

## 2024-01-24 DIAGNOSIS — E78.49 OTHER HYPERLIPIDEMIA: ICD-10-CM

## 2024-01-24 LAB
ALBUMIN SERPL BCP-MCNC: 4.1 G/DL (ref 3.5–5.2)
ALP SERPL-CCNC: 75 U/L (ref 55–135)
ALT SERPL W/O P-5'-P-CCNC: 46 U/L (ref 10–44)
ANION GAP SERPL CALC-SCNC: 7 MMOL/L (ref 8–16)
AST SERPL-CCNC: 35 U/L (ref 10–40)
BILIRUB SERPL-MCNC: 0.6 MG/DL (ref 0.1–1)
BUN SERPL-MCNC: 19 MG/DL (ref 8–23)
CALCIUM SERPL-MCNC: 10 MG/DL (ref 8.7–10.5)
CHLORIDE SERPL-SCNC: 107 MMOL/L (ref 95–110)
CHOLEST SERPL-MCNC: 93 MG/DL (ref 120–199)
CHOLEST/HDLC SERPL: 2.6 {RATIO} (ref 2–5)
CO2 SERPL-SCNC: 29 MMOL/L (ref 23–29)
CREAT SERPL-MCNC: 1.2 MG/DL (ref 0.5–1.4)
EST. GFR  (NO RACE VARIABLE): >60 ML/MIN/1.73 M^2
GLUCOSE SERPL-MCNC: 100 MG/DL (ref 70–110)
HDLC SERPL-MCNC: 36 MG/DL (ref 40–75)
HDLC SERPL: 38.7 % (ref 20–50)
LDLC SERPL CALC-MCNC: 39.8 MG/DL (ref 63–159)
NONHDLC SERPL-MCNC: 57 MG/DL
POTASSIUM SERPL-SCNC: 4.9 MMOL/L (ref 3.5–5.1)
PROT SERPL-MCNC: 7.2 G/DL (ref 6–8.4)
SODIUM SERPL-SCNC: 143 MMOL/L (ref 136–145)
TRIGL SERPL-MCNC: 86 MG/DL (ref 30–150)

## 2024-01-24 PROCEDURE — 36415 COLL VENOUS BLD VENIPUNCTURE: CPT | Mod: PN | Performed by: INTERNAL MEDICINE

## 2024-01-24 PROCEDURE — 80053 COMPREHEN METABOLIC PANEL: CPT | Performed by: INTERNAL MEDICINE

## 2024-01-24 PROCEDURE — 80061 LIPID PANEL: CPT | Performed by: INTERNAL MEDICINE

## 2024-01-30 DIAGNOSIS — I25.10 CORONARY ARTERY DISEASE INVOLVING NATIVE CORONARY ARTERY OF NATIVE HEART WITHOUT ANGINA PECTORIS: ICD-10-CM

## 2024-01-30 DIAGNOSIS — I25.10 ATHEROSCLEROSIS OF NATIVE CORONARY ARTERY OF NATIVE HEART WITHOUT ANGINA PECTORIS: Primary | ICD-10-CM

## 2024-01-31 ENCOUNTER — HOSPITAL ENCOUNTER (OUTPATIENT)
Dept: CARDIOLOGY | Facility: HOSPITAL | Age: 69
Discharge: HOME | End: 2024-01-31
Attending: INTERNAL MEDICINE
Payer: MEDICARE

## 2024-01-31 ENCOUNTER — OFFICE VISIT (OUTPATIENT)
Dept: CARDIOLOGY | Facility: CLINIC | Age: 69
End: 2024-01-31
Payer: MEDICARE

## 2024-01-31 VITALS
OXYGEN SATURATION: 97 % | BODY MASS INDEX: 34.15 KG/M2 | WEIGHT: 218.06 LBS | DIASTOLIC BLOOD PRESSURE: 84 MMHG | BODY MASS INDEX: 34.15 KG/M2 | WEIGHT: 218.06 LBS | SYSTOLIC BLOOD PRESSURE: 132 MMHG | HEART RATE: 56 BPM

## 2024-01-31 DIAGNOSIS — E66.9 OBESITY (BMI 30.0-34.9): ICD-10-CM

## 2024-01-31 DIAGNOSIS — I25.10 CORONARY ARTERY DISEASE INVOLVING NATIVE CORONARY ARTERY OF NATIVE HEART WITHOUT ANGINA PECTORIS: Primary | ICD-10-CM

## 2024-01-31 DIAGNOSIS — I25.10 ATHEROSCLEROSIS OF NATIVE CORONARY ARTERY OF NATIVE HEART WITHOUT ANGINA PECTORIS: ICD-10-CM

## 2024-01-31 DIAGNOSIS — R94.39 ABNORMAL STRESS TEST: ICD-10-CM

## 2024-01-31 DIAGNOSIS — I25.10 CORONARY ARTERY DISEASE INVOLVING NATIVE CORONARY ARTERY OF NATIVE HEART WITHOUT ANGINA PECTORIS: ICD-10-CM

## 2024-01-31 DIAGNOSIS — E78.49 OTHER HYPERLIPIDEMIA: ICD-10-CM

## 2024-01-31 DIAGNOSIS — I10 PRIMARY HYPERTENSION: ICD-10-CM

## 2024-01-31 PROCEDURE — 93005 ELECTROCARDIOGRAM TRACING: CPT

## 2024-01-31 PROCEDURE — 99999 PR PBB SHADOW E&M-EST. PATIENT-LVL III: CPT | Mod: PBBFAC,,, | Performed by: INTERNAL MEDICINE

## 2024-01-31 PROCEDURE — 99213 OFFICE O/P EST LOW 20 MIN: CPT | Mod: PBBFAC | Performed by: INTERNAL MEDICINE

## 2024-01-31 PROCEDURE — 93010 ELECTROCARDIOGRAM REPORT: CPT | Mod: ,,, | Performed by: INTERNAL MEDICINE

## 2024-01-31 PROCEDURE — 99213 OFFICE O/P EST LOW 20 MIN: CPT | Mod: S$PBB,25,, | Performed by: INTERNAL MEDICINE

## 2024-01-31 RX ORDER — TIRZEPATIDE 2.5 MG/.5ML
2.5 INJECTION, SOLUTION SUBCUTANEOUS
Qty: 4 PEN | Refills: 6 | Status: SHIPPED | OUTPATIENT
Start: 2024-01-31

## 2024-01-31 NOTE — PROGRESS NOTES
Subjective:   Patient ID:  Chino Manzano is a 68 y.o. male who presents for follow up of No chief complaint on file.      HPI  The patient came in today for cardiac consult of Follow-up     Prior visit  . Chino had a calcium score that showed some moderate calcification that prompted a stress test where he went 12 minutes of the Jad protocol and had some EKG changes with no symptoms. He subsequently underwent cardiac catheterization on June 6 by Dr. Kamar Harry that demonstrated 2 vessel coronary artery disease. He was subsequently recommended to have bypass surgery but was told that he had a poor target in his LAD then sought a second opinion by Dr. Gil at [facility]. Dr. Gil ordered a nuclear perfusion study that was normal. It is unclear whether he made a full recommendation for any type of stenting but once again the patient went 9 minutes of the Jad protocol on the nuclear perfusion study without symptoms and with normal myocardial perfusion. He presently denies any angina he is on appropriate medical therapy I discussed the cardiac catheterization films which I reviewed personally I went over the nuclear perfusion study with him and recommended continued medical therapy at the present time because he has non-left main non-LAD disease and a poor LAD target without angina pectoris and with a normal perfusion study at a high workload.           6/12/2020  A 63 Yo male with cad medically treated with negative cardiolite fairly active exercises regularily has no angina or shortness of breath tries to be compliant with diet weight unchanged.has still no cardiovascular complaints. No change in status since last visit in December.he is being more liberal with his evening snacks. Rasing his ldl.has normal ekg with pac. He pl;ays javi asymptomatic cuts grass with push mower asymptomatic.     12/17/2020  Asymptomatic exercising eats more but has increased exercise no new complaints. His lvf is normal his  stress test good exercise tolerance   lvf normal by echo he has no angina. He thinks he had the virus since his wife had it and he had cough and loss sense of smell and taste.      6/10/20/21   he has no change in status he is exercising however eating a lot late at nite he cuts the grass . He is completely asymptomatic   Patient feels no chest pain, no sob, no leg swelling, no PND, no palpitation, no dizziness, no syncope, no CNS symptoms.     Patient has fairly good exercise tolerance.     Patient is compliant with medications.     Results for orders placed during the hospital encounter of 12/11/20     Nuclear Stress - Cardiology Interpreted     Interpretation Summary    Normal myocardial perfusion scan. There is no evidence of myocardial ischemia or infarction.    Gated perfusion images showed an ejection fraction of 58% at rest and 65%.    There is normal wall motion at rest and post stress.    LV cavity size is normal at rest and normal at stress.    The EKG portion of this study is positive for ischemia.    The patient reported no chest pain during the stress test.    The exercise capacity was above average.     Results for orders placed during the hospital encounter of 12/11/20     Echo Color Flow Doppler? Yes     Interpretation Summary  · There is left ventricular concentric remodeling.  · With normal systolic function. The estimated ejection fraction is 60%.  · Normal left ventricular diastolic function.  · Normal right ventricular size with normal right ventricular systolic function.        12/13/2021   Not walking as much still plays [employer] exercises has no new complainsts he is going snow skiing. Has gained few lbs has some scapular pain that seems musculoskeletal in nature . He is asymptomatic cardiac wise intermittent lightheadedness positional   Lipid ok although increased. Still ldl on target     8/23/2022   has no new complaints still active [employer]7 exercise. Tenzin botello has been compliant  with meds      1/26/2023   Here for f/u he is asymptomatic exercises regularily he was in [place] HE WALKED A LOT . Denies any angina. Has no chf symptoms no arrythmias. He is compliant with diet  Has plantar fascitis he is not able to walk.      7/26/2023  Here forf /u has been limited with exercise due to ankle injury has not been able to exercise. Denies any symptoms.  Lipids on target . He cuts the grass no issues clinically.    1/31/2024  Has been non compliant with diet during the holidays he has no change in status otherwise compliant with diet  he is walking intermittently for exercise now is busy with tax season.  Past Medical History:   Diagnosis Date    Coronary artery disease     Heart murmur     Hypercholesterolemia     Hyperlipidemia     Hypertension     Insulin resistance syndrome     Mumps     childhood    MARCIA (obstructive sleep apnea)        Past Surgical History:   Procedure Laterality Date    CARDIAC CATHETERIZATION         Social History     Tobacco Use    Smoking status: Never    Smokeless tobacco: Never   Substance Use Topics    Alcohol use: Yes     Comment: occasional    Drug use: No       Family History   Problem Relation Age of Onset    Heart attack Brother 59    Heart attack Maternal Grandfather 65    Heart attack Paternal Grandfather 45    Hypertension Brother     Glaucoma Mother     Cancer Father     Stroke Neg Hx        Current Outpatient Medications   Medication Sig    aspirin (ECOTRIN) 81 MG EC tablet Take 81 mg by mouth once daily.    nebivoloL (BYSTOLIC) 5 MG Tab take ONE tablet by MOUTH ONCE a DAY    ramipriL (ALTACE) 2.5 MG capsule TAKE ONE CAPSULE ONCE DAILY    rosuvastatin (CRESTOR) 20 MG tablet TAKE ONE TABLET BY MOUTH EVERY EVENING    azithromycin (Z-JANEEN) 250 MG tablet Take 2 tablets by mouth on day 1; Take 1 tablet by mouth on days 2-5 (Patient not taking: Reported on 1/31/2024)    tirzepatide, weight loss, (ZEPBOUND) 2.5 mg/0.5 mL PnIj Inject 2.5 mg into the skin every 7 days.      No current facility-administered medications for this visit.     Current Outpatient Medications on File Prior to Visit   Medication Sig    aspirin (ECOTRIN) 81 MG EC tablet Take 81 mg by mouth once daily.    nebivoloL (BYSTOLIC) 5 MG Tab take ONE tablet by MOUTH ONCE a DAY    ramipriL (ALTACE) 2.5 MG capsule TAKE ONE CAPSULE ONCE DAILY    rosuvastatin (CRESTOR) 20 MG tablet TAKE ONE TABLET BY MOUTH EVERY EVENING    azithromycin (Z-JANEEN) 250 MG tablet Take 2 tablets by mouth on day 1; Take 1 tablet by mouth on days 2-5 (Patient not taking: Reported on 1/31/2024)     No current facility-administered medications on file prior to visit.     Review of patient's allergies indicates:  No Known Allergies   Review of Systems   Constitutional: Negative for malaise/fatigue.   Eyes:  Negative for blurred vision.   Cardiovascular:  Negative for chest pain, claudication, cyanosis, dyspnea on exertion, irregular heartbeat, leg swelling, near-syncope, orthopnea, palpitations and paroxysmal nocturnal dyspnea.   Respiratory:  Negative for cough, hemoptysis and shortness of breath.    Hematologic/Lymphatic: Negative for bleeding problem. Does not bruise/bleed easily.   Skin:  Negative for dry skin and itching.   Musculoskeletal:  Negative for falls, muscle weakness and myalgias.   Gastrointestinal:  Negative for abdominal pain, diarrhea, heartburn, hematemesis, hematochezia and melena.   Genitourinary:  Negative for flank pain and hematuria.   Neurological:  Negative for dizziness, focal weakness, headaches, light-headedness, numbness, paresthesias, seizures and weakness.   Psychiatric/Behavioral:  Negative for altered mental status and memory loss. The patient is not nervous/anxious.    Allergic/Immunologic: Negative for hives.       Objective:   Physical Exam  Vitals and nursing note reviewed.   Constitutional:       General: He is not in acute distress.     Appearance: He is well-developed. He is obese. He is not diaphoretic.  "  HENT:      Head: Normocephalic and atraumatic.   Eyes:      General:         Right eye: No discharge.         Left eye: No discharge.      Pupils: Pupils are equal, round, and reactive to light.   Neck:      Thyroid: No thyromegaly.      Vascular: No JVD.   Cardiovascular:      Rate and Rhythm: Normal rate and regular rhythm.      Pulses: Normal pulses and intact distal pulses.      Heart sounds: Normal heart sounds. No murmur heard.     No friction rub. No gallop.   Pulmonary:      Effort: Pulmonary effort is normal. No respiratory distress.      Breath sounds: Normal breath sounds. No wheezing or rales.   Chest:      Chest wall: No tenderness.   Abdominal:      General: Bowel sounds are normal. There is no distension.      Palpations: Abdomen is soft.      Tenderness: There is no abdominal tenderness.   Musculoskeletal:         General: Normal range of motion.      Cervical back: Neck supple.   Skin:     General: Skin is warm and dry.      Findings: No erythema or rash.   Neurological:      General: No focal deficit present.      Mental Status: He is alert and oriented to person, place, and time.      Cranial Nerves: No cranial nerve deficit.   Psychiatric:         Mood and Affect: Mood normal.         Behavior: Behavior normal.       Vitals:    01/31/24 1543 01/31/24 1544   BP: 124/76 132/84   BP Location: Right arm Left arm   Patient Position: Sitting Sitting   BP Method: Small (Manual) Small (Manual)   Pulse: (!) 56    SpO2: 97%    Weight: 98.9 kg (218 lb 0.6 oz)      Lab Results   Component Value Date    CHOL 93 (L) 01/24/2024    CHOL 96 (L) 07/19/2023    CHOL 112 (L) 01/19/2023      Body mass index is 34.15 kg/m².   No results found for: "LABA1C", "HGBA1C"   [facility]  Lab Results   Component Value Date     01/24/2024    K 4.9 01/24/2024     01/24/2024    CO2 29 01/24/2024    BUN 19 01/24/2024    CREATININE 1.2 01/24/2024    CALCIUM 10.0 01/24/2024    ANIONGAP 7 (L) 01/24/2024    EGFRNORACEVR >60.0 " "01/24/2024      Lab Results   Component Value Date    HDL 36 (L) 01/24/2024    HDL 36 (L) 07/19/2023    HDL 41 01/19/2023     Lab Results   Component Value Date    LDLCALC 39.8 (L) 01/24/2024    LDLCALC 37.6 (L) 07/19/2023    LDLCALC 53.8 (L) 01/19/2023     Lab Results   Component Value Date    TRIG 86 01/24/2024    TRIG 112 07/19/2023    TRIG 86 01/19/2023     Lab Results   Component Value Date    CHOLHDL 38.7 01/24/2024    CHOLHDL 37.5 07/19/2023    CHOLHDL 36.6 01/19/2023       Chemistry        Component Value Date/Time     01/24/2024 0800    K 4.9 01/24/2024 0800     01/24/2024 0800    CO2 29 01/24/2024 0800    BUN 19 01/24/2024 0800    CREATININE 1.2 01/24/2024 0800     01/24/2024 0800        Component Value Date/Time    CALCIUM 10.0 01/24/2024 0800    ALKPHOS 75 01/24/2024 0800    AST 35 01/24/2024 0800    ALT 46 (H) 01/24/2024 0800    BILITOT 0.6 01/24/2024 0800    ESTGFRAFRICA >60.0 06/04/2021 0811    EGFRNONAA 64 12/02/2021 0737          Lab Results   Component Value Date    TSH 2.360 12/02/2021     No results found for: "INR", "PROTIME"  Lab Results   Component Value Date    WBC 7.1 12/02/2021    HGB 15.4 12/02/2021    HCT 48.6 12/02/2021    MCV 90 12/02/2021     12/02/2021     BMP  Sodium   Date Value Ref Range Status   01/24/2024 143 136 - 145 mmol/L Final     Potassium   Date Value Ref Range Status   01/24/2024 4.9 3.5 - 5.1 mmol/L Final     Chloride   Date Value Ref Range Status   01/24/2024 107 95 - 110 mmol/L Final     CO2   Date Value Ref Range Status   01/24/2024 29 23 - 29 mmol/L Final     BUN   Date Value Ref Range Status   01/24/2024 19 8 - 23 mg/dL Final     Creatinine   Date Value Ref Range Status   01/24/2024 1.2 0.5 - 1.4 mg/dL Final     Calcium   Date Value Ref Range Status   01/24/2024 10.0 8.7 - 10.5 mg/dL Final     Anion Gap   Date Value Ref Range Status   01/24/2024 7 (L) 8 - 16 mmol/L Final     eGFR if    Date Value Ref Range Status "   06/04/2021 >60.0 >60 mL/min/1.73 m^2 Final     eGFR if non    Date Value Ref Range Status   12/02/2021 64 >59 mL/min/1.73 Final     CrCl cannot be calculated (Patient's most recent lab result is older than the maximum 7 days allowed.).    Assessment:     1. Coronary artery disease involving native coronary artery of native heart without angina pectoris    2. Other hyperlipidemia    3. Primary hypertension    4. Abnormal stress test    5. Atherosclerosis of native coronary artery of native heart without angina pectoris    6. Obesity (BMI 30.0-34.9)    He is stioll asymptomatic with htn and lipids on target he needs  to exercise lose weight watch diet he is an ideal candidate for zepbound due to obesity and comorbidities manzano end a prescription.   EKG showed pac he is asymptomatic will observe  Plan:   As per above  Continue current therapy  Cardiac low salt diet.  Risk factor modification and excercise program./weight loss  F/u in 6 months with lipid cmp

## 2024-04-13 DIAGNOSIS — I10 ESSENTIAL HYPERTENSION: ICD-10-CM

## 2024-04-15 RX ORDER — NEBIVOLOL 5 MG/1
5 TABLET ORAL
Qty: 30 TABLET | Refills: 5 | Status: SHIPPED | OUTPATIENT
Start: 2024-04-15

## 2024-06-08 DIAGNOSIS — I10 ESSENTIAL HYPERTENSION: ICD-10-CM

## 2024-06-12 RX ORDER — RAMIPRIL 2.5 MG/1
CAPSULE ORAL
Qty: 90 CAPSULE | Refills: 1 | Status: SHIPPED | OUTPATIENT
Start: 2024-06-12

## 2024-06-25 DIAGNOSIS — E78.49 OTHER HYPERLIPIDEMIA: ICD-10-CM

## 2024-06-25 RX ORDER — ROSUVASTATIN CALCIUM 20 MG/1
TABLET, COATED ORAL
Qty: 90 TABLET | Refills: 1 | Status: SHIPPED | OUTPATIENT
Start: 2024-06-25

## 2024-08-01 ENCOUNTER — LAB VISIT (OUTPATIENT)
Dept: LAB | Facility: HOSPITAL | Age: 69
End: 2024-08-01
Attending: INTERNAL MEDICINE
Payer: MEDICARE

## 2024-08-01 DIAGNOSIS — I25.10 CORONARY ARTERY DISEASE INVOLVING NATIVE CORONARY ARTERY OF NATIVE HEART WITHOUT ANGINA PECTORIS: ICD-10-CM

## 2024-08-01 DIAGNOSIS — E78.49 OTHER HYPERLIPIDEMIA: ICD-10-CM

## 2024-08-01 LAB
ALBUMIN SERPL BCP-MCNC: 4.1 G/DL (ref 3.5–5.2)
ALP SERPL-CCNC: 68 U/L (ref 55–135)
ALT SERPL W/O P-5'-P-CCNC: 33 U/L (ref 10–44)
ANION GAP SERPL CALC-SCNC: 7 MMOL/L (ref 8–16)
AST SERPL-CCNC: 30 U/L (ref 10–40)
BILIRUB SERPL-MCNC: 0.9 MG/DL (ref 0.1–1)
BUN SERPL-MCNC: 17 MG/DL (ref 8–23)
CALCIUM SERPL-MCNC: 9.3 MG/DL (ref 8.7–10.5)
CHLORIDE SERPL-SCNC: 108 MMOL/L (ref 95–110)
CHOLEST SERPL-MCNC: 109 MG/DL (ref 120–199)
CHOLEST/HDLC SERPL: 2.6 {RATIO} (ref 2–5)
CO2 SERPL-SCNC: 26 MMOL/L (ref 23–29)
CREAT SERPL-MCNC: 1.2 MG/DL (ref 0.5–1.4)
EST. GFR  (NO RACE VARIABLE): >60 ML/MIN/1.73 M^2
GLUCOSE SERPL-MCNC: 103 MG/DL (ref 70–110)
HDLC SERPL-MCNC: 42 MG/DL (ref 40–75)
HDLC SERPL: 38.5 % (ref 20–50)
LDLC SERPL CALC-MCNC: 52.2 MG/DL (ref 63–159)
NONHDLC SERPL-MCNC: 67 MG/DL
POTASSIUM SERPL-SCNC: 4.8 MMOL/L (ref 3.5–5.1)
PROT SERPL-MCNC: 7.2 G/DL (ref 6–8.4)
SODIUM SERPL-SCNC: 141 MMOL/L (ref 136–145)
TRIGL SERPL-MCNC: 74 MG/DL (ref 30–150)

## 2024-08-01 PROCEDURE — 80053 COMPREHEN METABOLIC PANEL: CPT | Performed by: INTERNAL MEDICINE

## 2024-08-01 PROCEDURE — 36415 COLL VENOUS BLD VENIPUNCTURE: CPT | Mod: PN | Performed by: INTERNAL MEDICINE

## 2024-08-01 PROCEDURE — 80061 LIPID PANEL: CPT | Performed by: INTERNAL MEDICINE

## 2024-08-14 ENCOUNTER — OFFICE VISIT (OUTPATIENT)
Dept: CARDIOLOGY | Facility: CLINIC | Age: 69
End: 2024-08-14
Payer: MEDICARE

## 2024-08-14 VITALS
OXYGEN SATURATION: 58 % | DIASTOLIC BLOOD PRESSURE: 60 MMHG | HEIGHT: 66 IN | HEART RATE: 58 BPM | SYSTOLIC BLOOD PRESSURE: 120 MMHG | WEIGHT: 220.25 LBS | BODY MASS INDEX: 35.4 KG/M2

## 2024-08-14 DIAGNOSIS — E78.49 OTHER HYPERLIPIDEMIA: ICD-10-CM

## 2024-08-14 DIAGNOSIS — I25.10 CORONARY ARTERY DISEASE INVOLVING NATIVE CORONARY ARTERY OF NATIVE HEART WITHOUT ANGINA PECTORIS: Primary | ICD-10-CM

## 2024-08-14 DIAGNOSIS — I25.10 ATHEROSCLEROSIS OF NATIVE CORONARY ARTERY OF NATIVE HEART WITHOUT ANGINA PECTORIS: ICD-10-CM

## 2024-08-14 DIAGNOSIS — I10 PRIMARY HYPERTENSION: ICD-10-CM

## 2024-08-14 DIAGNOSIS — E66.01 SEVERE OBESITY (BMI 35.0-39.9) WITH COMORBIDITY: ICD-10-CM

## 2024-08-14 DIAGNOSIS — R94.39 ABNORMAL STRESS TEST: ICD-10-CM

## 2024-08-14 PROCEDURE — 99214 OFFICE O/P EST MOD 30 MIN: CPT | Mod: PBBFAC | Performed by: INTERNAL MEDICINE

## 2024-08-14 PROCEDURE — 99212 OFFICE O/P EST SF 10 MIN: CPT | Mod: S$PBB,,, | Performed by: INTERNAL MEDICINE

## 2024-08-14 PROCEDURE — 99999 PR PBB SHADOW E&M-EST. PATIENT-LVL IV: CPT | Mod: PBBFAC,,, | Performed by: INTERNAL MEDICINE

## 2024-08-14 NOTE — PROGRESS NOTES
Subjective:   Patient ID:  Chino Manzano is a 68 y.o. male who presents for follow up of Follow-up      HPI  The patient came in today for cardiac consult of Follow-up     Prior visit  . Chino had a calcium score that showed some moderate calcification that prompted a stress test where he went 12 minutes of the Jad protocol and had some EKG changes with no symptoms. He subsequently underwent cardiac catheterization on June 6 by Dr. Kamar Harry that demonstrated 2 vessel coronary artery disease. He was subsequently recommended to have bypass surgery but was told that he had a poor target in his LAD then sought a second opinion by Dr. Gil at [facility]. Dr. Gil ordered a nuclear perfusion study that was normal. It is unclear whether he made a full recommendation for any type of stenting but once again the patient went 9 minutes of the Jad protocol on the nuclear perfusion study without symptoms and with normal myocardial perfusion. He presently denies any angina he is on appropriate medical therapy I discussed the cardiac catheterization films which I reviewed personally I went over the nuclear perfusion study with him and recommended continued medical therapy at the present time because he has non-left main non-LAD disease and a poor LAD target without angina pectoris and with a normal perfusion study at a high workload.           6/12/2020  A 63 Yo male with cad medically treated with negative cardiolite fairly active exercises regularily has no angina or shortness of breath tries to be compliant with diet weight unchanged.has still no cardiovascular complaints. No change in status since last visit in December.he is being more liberal with his evening snacks. Rasing his ldl.has normal ekg with pac. He pl;ays javi asymptomatic cuts grass with push mower asymptomatic.     12/17/2020  Asymptomatic exercising eats more but has increased exercise no new complaints. His lvf is normal his stress test good  exercise tolerance   lvf normal by echo he has no angina. He thinks he had the virus since his wife had it and he had cough and loss sense of smell and taste.      6/10/20/21   he has no change in status he is exercising however eating a lot late at nite he cuts the grass . He is completely asymptomatic   Patient feels no chest pain, no sob, no leg swelling, no PND, no palpitation, no dizziness, no syncope, no CNS symptoms.     Patient has fairly good exercise tolerance.     Patient is compliant with medications.     Results for orders placed during the hospital encounter of 12/11/20     Nuclear Stress - Cardiology Interpreted     Interpretation Summary    Normal myocardial perfusion scan. There is no evidence of myocardial ischemia or infarction.    Gated perfusion images showed an ejection fraction of 58% at rest and 65%.    There is normal wall motion at rest and post stress.    LV cavity size is normal at rest and normal at stress.    The EKG portion of this study is positive for ischemia.    The patient reported no chest pain during the stress test.    The exercise capacity was above average.     Results for orders placed during the hospital encounter of 12/11/20     Echo Color Flow Doppler? Yes     Interpretation Summary  · There is left ventricular concentric remodeling.  · With normal systolic function. The estimated ejection fraction is 60%.  · Normal left ventricular diastolic function.  · Normal right ventricular size with normal right ventricular systolic function.        12/13/2021   Not walking as much still plays [employer] exercises has no new complainsts he is going snow skiing. Has gained few lbs has some scapular pain that seems musculoskeletal in nature . He is asymptomatic cardiac wise intermittent lightheadedness positional   Lipid ok although increased. Still ldl on target     8/23/2022   has no new complaints still active [employer] exercise. Tenzin botello has been compliant with meds       1/26/2023   Here for f/u he is asymptomatic exercises regularily he was in [place] HE WALKED A LOT . Denies any angina. Has no chf symptoms no arrythmias. He is compliant with diet  Has plantar fascitis he is not able to walk.      7/26/2023  Here forf /u has been limited with exercise due to ankle injury has not been able to exercise. Denies any symptoms.  Lipids on target . He cuts the grass no issues clinically.     1/31/2024  Has been non compliant with diet during the holidays he has no change in status otherwise compliant with diet  he is walking intermittently for exercise now is busy with [employer] season.    8/14/2024   Here for f/u stays active cuts grass works in yard asymptomatic   He is on ozempic now he is losing weight his lipids are on target.   Past Medical History:   Diagnosis Date    Coronary artery disease     Heart murmur     Hypercholesterolemia     Hyperlipidemia     Hypertension     Insulin resistance syndrome     Mumps     childhood    MARCIA (obstructive sleep apnea)        Past Surgical History:   Procedure Laterality Date    CARDIAC CATHETERIZATION         Social History     Tobacco Use    Smoking status: Never    Smokeless tobacco: Never   Substance Use Topics    Alcohol use: Yes     Comment: occasional    Drug use: No       Family History   Problem Relation Name Age of Onset    Heart attack Brother  59    Heart attack Maternal Grandfather  65    Heart attack Paternal Grandfather  45    Hypertension Brother      Glaucoma Mother      Cancer Father      Stroke Neg Hx         Current Outpatient Medications   Medication Sig    aspirin (ECOTRIN) 81 MG EC tablet Take 81 mg by mouth once daily.    nebivoloL (BYSTOLIC) 5 MG Tab TAKE ONE TABLET BY MOUTH EVERY DAY    ramipriL (ALTACE) 2.5 MG capsule TAKE ONE CAPSULE ONCE DAILY    rosuvastatin (CRESTOR) 20 MG tablet TAKE ONE TABLET BY MOUTH EVERY EVENING    semaglutide (OZEMPIC) 0.25 mg or 0.5 mg(2 mg/1.5 mL) pen injector Inject 0.25 mg into the skin every  7 days.    UNABLE TO FIND medication name: semaglutide / b12 - .25 mg/per syringe. [facility].     No current facility-administered medications for this visit.     Current Outpatient Medications on File Prior to Visit   Medication Sig    aspirin (ECOTRIN) 81 MG EC tablet Take 81 mg by mouth once daily.    nebivoloL (BYSTOLIC) 5 MG Tab TAKE ONE TABLET BY MOUTH EVERY DAY    ramipriL (ALTACE) 2.5 MG capsule TAKE ONE CAPSULE ONCE DAILY    rosuvastatin (CRESTOR) 20 MG tablet TAKE ONE TABLET BY MOUTH EVERY EVENING    semaglutide (OZEMPIC) 0.25 mg or 0.5 mg(2 mg/1.5 mL) pen injector Inject 0.25 mg into the skin every 7 days.    UNABLE TO FIND medication name: semaglutide / b12 - .25 mg/per syringe. [facility] to [facility].     No current facility-administered medications on file prior to visit.     Review of patient's allergies indicates:  No Known Allergies   Review of Systems   Constitutional: Negative for malaise/fatigue.   Eyes:  Negative for blurred vision.   Cardiovascular:  Negative for chest pain, claudication, cyanosis, dyspnea on exertion, irregular heartbeat, leg swelling, near-syncope, orthopnea, palpitations and paroxysmal nocturnal dyspnea.   Respiratory:  Negative for cough, hemoptysis and shortness of breath.    Hematologic/Lymphatic: Negative for bleeding problem. Does not bruise/bleed easily.   Skin:  Negative for dry skin and itching.   Musculoskeletal:  Negative for falls, muscle weakness and myalgias.   Gastrointestinal:  Negative for abdominal pain, diarrhea, heartburn, hematemesis, hematochezia and melena.   Genitourinary:  Negative for flank pain and hematuria.   Neurological:  Negative for dizziness, focal weakness, headaches, light-headedness, numbness, paresthesias, seizures and weakness.   Psychiatric/Behavioral:  Negative for altered mental status and memory loss. The patient is not nervous/anxious.    Allergic/Immunologic: Negative for hives.  "      Objective:   Physical Exam  Vitals and nursing note reviewed.   Constitutional:       General: He is not in acute distress.     Appearance: He is well-developed. He is not diaphoretic.   HENT:      Head: Normocephalic and atraumatic.   Eyes:      General:         Right eye: No discharge.         Left eye: No discharge.      Pupils: Pupils are equal, round, and reactive to light.   Neck:      Thyroid: No thyromegaly.      Vascular: No JVD.   Cardiovascular:      Rate and Rhythm: Normal rate and regular rhythm.      Pulses: Intact distal pulses.      Heart sounds: Normal heart sounds. No murmur heard.     No friction rub. No gallop.   Pulmonary:      Effort: Pulmonary effort is normal. No respiratory distress.      Breath sounds: Normal breath sounds. No wheezing or rales.   Chest:      Chest wall: No tenderness.   Abdominal:      General: Bowel sounds are normal. There is no distension.      Palpations: Abdomen is soft.      Tenderness: There is no abdominal tenderness.   Musculoskeletal:         General: Normal range of motion.      Cervical back: Neck supple.   Skin:     General: Skin is warm and dry.      Findings: No erythema or rash.   Neurological:      Mental Status: He is alert and oriented to person, place, and time.      Cranial Nerves: No cranial nerve deficit.   Psychiatric:         Behavior: Behavior normal.       Vitals:    08/14/24 1511 08/14/24 1514   BP: 120/70 120/60   BP Location: Right arm Left arm   Patient Position: Sitting Sitting   BP Method: Large (Manual) Large (Manual)   Pulse: 96 (!) 58   SpO2: (!) 58%    Weight: 99.9 kg (220 lb 3.8 oz)    Height: 5' 6" (1.676 m)      Lab Results   Component Value Date    CHOL 109 (L) 08/01/2024    CHOL 117 07/03/2024    CHOL 93 (L) 01/24/2024      Body mass index is 35.55 kg/m².   No results found for: "LABA1C", "HGBA1C"   [facility]  Lab Results   Component Value Date     08/01/2024    K 4.8 08/01/2024     08/01/2024    CO2 26 08/01/2024    " "BUN 17 08/01/2024    CREATININE 1.2 08/01/2024    CALCIUM 9.3 08/01/2024    ANIONGAP 7 (L) 08/01/2024    EGFRNORACEVR >60.0 08/01/2024      Lab Results   Component Value Date    HDL 42 08/01/2024    HDL 40 07/03/2024    HDL 36 (L) 01/24/2024     Lab Results   Component Value Date    LDLCALC 52.2 (L) 08/01/2024    LDLCALC 56 07/03/2024    LDLCALC 39.8 (L) 01/24/2024     Lab Results   Component Value Date    TRIG 74 08/01/2024    TRIG 113 07/03/2024    TRIG 86 01/24/2024     Lab Results   Component Value Date    CHOLHDL 38.5 08/01/2024    CHOLHDL 38.7 01/24/2024    CHOLHDL 37.5 07/19/2023       Chemistry        Component Value Date/Time     08/01/2024 0800    K 4.8 08/01/2024 0800     08/01/2024 0800    CO2 26 08/01/2024 0800    BUN 17 08/01/2024 0800    CREATININE 1.2 08/01/2024 0800     08/01/2024 0800        Component Value Date/Time    CALCIUM 9.3 08/01/2024 0800    ALKPHOS 68 08/01/2024 0800    AST 30 08/01/2024 0800    ALT 33 08/01/2024 0800    BILITOT 0.9 08/01/2024 0800    ESTGFRAFRICA >60.0 06/04/2021 0811    EGFRNONAA 64 12/02/2021 0737          Lab Results   Component Value Date    TSH 3.070 07/03/2024     No results found for: "INR", "PROTIME"  Lab Results   Component Value Date    WBC 7.6 07/03/2024    HGB 15.4 07/03/2024    HCT 49.3 07/03/2024    MCV 91 07/03/2024     07/03/2024     BMP  Sodium   Date Value Ref Range Status   08/01/2024 141 136 - 145 mmol/L Final     Potassium   Date Value Ref Range Status   08/01/2024 4.8 3.5 - 5.1 mmol/L Final     Chloride   Date Value Ref Range Status   08/01/2024 108 95 - 110 mmol/L Final     CO2   Date Value Ref Range Status   08/01/2024 26 23 - 29 mmol/L Final     BUN   Date Value Ref Range Status   08/01/2024 17 8 - 23 mg/dL Final     Creatinine   Date Value Ref Range Status   08/01/2024 1.2 0.5 - 1.4 mg/dL Final     Calcium   Date Value Ref Range Status   08/01/2024 9.3 8.7 - 10.5 mg/dL Final     Anion Gap   Date Value Ref Range Status "   08/01/2024 7 (L) 8 - 16 mmol/L Final     eGFR if    Date Value Ref Range Status   06/04/2021 >60.0 >60 mL/min/1.73 m^2 Final     eGFR if non    Date Value Ref Range Status   12/02/2021 64 >59 mL/min/1.73 Final     CrCl cannot be calculated (Patient's most recent lab result is older than the maximum 7 days allowed.).    Assessment:     1. Coronary artery disease involving native coronary artery of native heart without angina pectoris    2. Severe obesity (BMI 35.0-39.9) with comorbidity    3. Other hyperlipidemia    4. Primary hypertension    5. Abnormal stress test    6. Atherosclerosis of native coronary artery of native heart without angina pectoris    Asymptomatic cardiac wise using cpap compliant with  lipids on target good exercise tolerance losing weight exercises regularily continue the same.     Plan:   Continue current therapy  Cardiac low salt diet.  Risk factor modification and excercise program./weight loss  F/u in 6 months with lipid cmp

## 2024-10-17 DIAGNOSIS — I10 ESSENTIAL HYPERTENSION: ICD-10-CM

## 2024-10-17 RX ORDER — NEBIVOLOL 5 MG/1
5 TABLET ORAL
Qty: 30 TABLET | Refills: 5 | Status: SHIPPED | OUTPATIENT
Start: 2024-10-17

## 2024-12-14 DIAGNOSIS — I10 ESSENTIAL HYPERTENSION: ICD-10-CM

## 2024-12-16 RX ORDER — RAMIPRIL 2.5 MG/1
CAPSULE ORAL
Qty: 90 CAPSULE | Refills: 3 | Status: SHIPPED | OUTPATIENT
Start: 2024-12-16

## 2025-01-02 DIAGNOSIS — E78.49 OTHER HYPERLIPIDEMIA: ICD-10-CM

## 2025-01-02 RX ORDER — ROSUVASTATIN CALCIUM 20 MG/1
TABLET, COATED ORAL
Qty: 90 TABLET | Refills: 3 | Status: SHIPPED | OUTPATIENT
Start: 2025-01-02

## 2025-03-13 ENCOUNTER — LAB VISIT (OUTPATIENT)
Dept: LAB | Facility: HOSPITAL | Age: 70
End: 2025-03-13
Attending: INTERNAL MEDICINE
Payer: MEDICARE

## 2025-03-13 DIAGNOSIS — E78.49 OTHER HYPERLIPIDEMIA: ICD-10-CM

## 2025-03-13 LAB
ALBUMIN SERPL BCP-MCNC: 4.2 G/DL (ref 3.5–5.2)
ALP SERPL-CCNC: 70 U/L (ref 40–150)
ALT SERPL W/O P-5'-P-CCNC: 42 U/L (ref 10–44)
ANION GAP SERPL CALC-SCNC: 10 MMOL/L (ref 8–16)
AST SERPL-CCNC: 30 U/L (ref 10–40)
BILIRUB SERPL-MCNC: 0.8 MG/DL (ref 0.1–1)
BUN SERPL-MCNC: 20 MG/DL (ref 8–23)
CALCIUM SERPL-MCNC: 9.5 MG/DL (ref 8.7–10.5)
CHLORIDE SERPL-SCNC: 107 MMOL/L (ref 95–110)
CHOLEST SERPL-MCNC: 100 MG/DL (ref 120–199)
CHOLEST/HDLC SERPL: 2.6 {RATIO} (ref 2–5)
CO2 SERPL-SCNC: 23 MMOL/L (ref 23–29)
CREAT SERPL-MCNC: 1.3 MG/DL (ref 0.5–1.4)
EST. GFR  (NO RACE VARIABLE): 59.8 ML/MIN/1.73 M^2
GLUCOSE SERPL-MCNC: 91 MG/DL (ref 70–110)
HDLC SERPL-MCNC: 39 MG/DL (ref 40–75)
HDLC SERPL: 39 % (ref 20–50)
LDLC SERPL CALC-MCNC: 48.6 MG/DL (ref 63–159)
NONHDLC SERPL-MCNC: 61 MG/DL
POTASSIUM SERPL-SCNC: 5 MMOL/L (ref 3.5–5.1)
PROT SERPL-MCNC: 7.4 G/DL (ref 6–8.4)
SODIUM SERPL-SCNC: 140 MMOL/L (ref 136–145)
TRIGL SERPL-MCNC: 62 MG/DL (ref 30–150)

## 2025-03-13 PROCEDURE — 36415 COLL VENOUS BLD VENIPUNCTURE: CPT | Mod: PN | Performed by: INTERNAL MEDICINE

## 2025-03-13 PROCEDURE — 80061 LIPID PANEL: CPT | Performed by: INTERNAL MEDICINE

## 2025-03-13 PROCEDURE — 80053 COMPREHEN METABOLIC PANEL: CPT | Performed by: INTERNAL MEDICINE

## 2025-03-19 DIAGNOSIS — I10 ESSENTIAL HYPERTENSION: Primary | ICD-10-CM

## 2025-03-20 ENCOUNTER — HOSPITAL ENCOUNTER (OUTPATIENT)
Dept: CARDIOLOGY | Facility: HOSPITAL | Age: 70
Discharge: HOME | End: 2025-03-20
Attending: INTERNAL MEDICINE
Payer: MEDICARE

## 2025-03-20 ENCOUNTER — OFFICE VISIT (OUTPATIENT)
Dept: CARDIOLOGY | Facility: CLINIC | Age: 70
End: 2025-03-20
Payer: MEDICARE

## 2025-03-20 VITALS
HEART RATE: 64 BPM | BODY MASS INDEX: 34.15 KG/M2 | HEIGHT: 66 IN | DIASTOLIC BLOOD PRESSURE: 72 MMHG | SYSTOLIC BLOOD PRESSURE: 128 MMHG | WEIGHT: 212.5 LBS

## 2025-03-20 DIAGNOSIS — I25.10 CORONARY ARTERY DISEASE INVOLVING NATIVE CORONARY ARTERY OF NATIVE HEART WITHOUT ANGINA PECTORIS: Primary | ICD-10-CM

## 2025-03-20 DIAGNOSIS — I10 ESSENTIAL HYPERTENSION: ICD-10-CM

## 2025-03-20 DIAGNOSIS — E88.810 INSULIN RESISTANCE SYNDROME: ICD-10-CM

## 2025-03-20 DIAGNOSIS — R94.39 ABNORMAL STRESS TEST: ICD-10-CM

## 2025-03-20 DIAGNOSIS — E78.49 OTHER HYPERLIPIDEMIA: ICD-10-CM

## 2025-03-20 DIAGNOSIS — G47.33 OSA (OBSTRUCTIVE SLEEP APNEA): ICD-10-CM

## 2025-03-20 DIAGNOSIS — E66.01 SEVERE OBESITY (BMI 35.0-39.9) WITH COMORBIDITY: ICD-10-CM

## 2025-03-20 DIAGNOSIS — I10 PRIMARY HYPERTENSION: ICD-10-CM

## 2025-03-20 DIAGNOSIS — R79.9 ABNORMAL FINDING OF BLOOD CHEMISTRY, UNSPECIFIED: ICD-10-CM

## 2025-03-20 DIAGNOSIS — I25.10 ATHEROSCLEROSIS OF NATIVE CORONARY ARTERY OF NATIVE HEART WITHOUT ANGINA PECTORIS: ICD-10-CM

## 2025-03-20 PROCEDURE — 99999 PR PBB SHADOW E&M-EST. PATIENT-LVL II: CPT | Mod: PBBFAC,,, | Performed by: INTERNAL MEDICINE

## 2025-03-20 PROCEDURE — 99214 OFFICE O/P EST MOD 30 MIN: CPT | Mod: S$PBB,,, | Performed by: INTERNAL MEDICINE

## 2025-03-20 PROCEDURE — 93005 ELECTROCARDIOGRAM TRACING: CPT

## 2025-03-20 PROCEDURE — 93010 ELECTROCARDIOGRAM REPORT: CPT | Mod: ,,, | Performed by: INTERNAL MEDICINE

## 2025-03-20 PROCEDURE — 99212 OFFICE O/P EST SF 10 MIN: CPT | Mod: PBBFAC | Performed by: INTERNAL MEDICINE

## 2025-03-20 NOTE — PROGRESS NOTES
Subjective:   Patient ID:  Chino Manzano is a 69 y.o. male who presents for follow up of No chief complaint on file.      HPI  The patient came in today for cardiac consult of Follow-up     Prior visit  . Chino had a calcium score that showed some moderate calcification that prompted a stress test where he went 12 minutes of the Jad protocol and had some EKG changes with no symptoms. He subsequently underwent cardiac catheterization on June 6 by Dr. Kamar Harry that demonstrated 2 vessel coronary artery disease. He was subsequently recommended to have bypass surgery but was told that he had a poor target in his LAD then sought a second opinion by Dr. Gil at [facility]. Dr. Gil ordered a nuclear perfusion study that was normal. It is unclear whether he made a full recommendation for any type of stenting but once again the patient went 9 minutes of the Jad protocol on the nuclear perfusion study without symptoms and with normal myocardial perfusion. He presently denies any angina he is on appropriate medical therapy I discussed the cardiac catheterization films which I reviewed personally I went over the nuclear perfusion study with him and recommended continued medical therapy at the present time because he has non-left main non-LAD disease and a poor LAD target without angina pectoris and with a normal perfusion study at a high workload.           6/12/2020  A 63 Yo male with cad medically treated with negative cardiolite fairly active exercises regularily has no angina or shortness of breath tries to be compliant with diet weight unchanged.has still no cardiovascular complaints. No change in status since last visit in December.he is being more liberal with his evening snacks. Rasing his ldl.has normal ekg with pac. He pl;ays javi asymptomatic cuts grass with push mower asymptomatic.     12/17/2020  Asymptomatic exercising eats more but has increased exercise no new complaints. His lvf is normal his  stress test good exercise tolerance   lvf normal by echo he has no angina. He thinks he had the virus since his wife had it and he had cough and loss sense of smell and taste.      6/10/20/21   he has no change in status he is exercising however eating a lot late at nite he cuts the grass . He is completely asymptomatic   Patient feels no chest pain, no sob, no leg swelling, no PND, no palpitation, no dizziness, no syncope, no CNS symptoms.     Patient has fairly good exercise tolerance.     Patient is compliant with medications.     Results for orders placed during the hospital encounter of 12/11/20     Nuclear Stress - Cardiology Interpreted     Interpretation Summary    Normal myocardial perfusion scan. There is no evidence of myocardial ischemia or infarction.    Gated perfusion images showed an ejection fraction of 58% at rest and 65%.    There is normal wall motion at rest and post stress.    LV cavity size is normal at rest and normal at stress.    The EKG portion of this study is positive for ischemia.    The patient reported no chest pain during the stress test.    The exercise capacity was above average.     Results for orders placed during the hospital encounter of 12/11/20     Echo Color Flow Doppler? Yes     Interpretation Summary  · There is left ventricular concentric remodeling.  · With normal systolic function. The estimated ejection fraction is 60%.  · Normal left ventricular diastolic function.  · Normal right ventricular size with normal right ventricular systolic function.        12/13/2021   Not walking as much still plays [employer] exercises has no new complainsts he is going snow skiing. Has gained few lbs has some scapular pain that seems musculoskeletal in nature . He is asymptomatic cardiac wise intermittent lightheadedness positional   Lipid ok although increased. Still ldl on target     8/23/2022   has no new complaints still active [employer] exercise. Tenzin botello has been compliant  with meds      1/26/2023   Here for f/u he is asymptomatic exercises regularily he was in [place] HE WALKED A LOT . Denies any angina. Has no chf symptoms no arrythmias. He is compliant with diet  Has plantar fascitis he is not able to walk.      7/26/2023  Here forf /u has been limited with exercise due to ankle injury has not been able to exercise. Denies any symptoms.  Lipids on target . He cuts the grass no issues clinically.     1/31/2024  Has been non compliant with diet during the holidays he has no change in status otherwise compliant with diet  he is walking intermittently for exercise now is busy with tax season.     8/14/2024   Here for f/u stays active cuts grass works in yard asymptomatic   He is on ozempic now he is losing weight his lipids are on target.     3/20/2025  On compounded oze,[facility] lost 12 lbs still asymptomatic cardiac wise.  Past Medical History:   Diagnosis Date    Coronary artery disease     Heart murmur     Hypercholesterolemia     Hyperlipidemia     Hypertension     Insulin resistance syndrome     Mumps     childhood    MARCIA (obstructive sleep apnea)        Past Surgical History:   Procedure Laterality Date    CARDIAC CATHETERIZATION         Social History[1]    Family History   Problem Relation Name Age of Onset    Heart attack Brother  59    Heart attack Maternal Grandfather  65    Heart attack Paternal Grandfather  45    Hypertension Brother      Glaucoma Mother      Cancer Father      Stroke Neg Hx         Current Outpatient Medications   Medication Sig    aspirin (ECOTRIN) 81 MG EC tablet Take 81 mg by mouth once daily.    nebivoloL (BYSTOLIC) 5 MG Tab TAKE ONE TABLET BY MOUTH EVERY DAY    ramipriL (ALTACE) 2.5 MG capsule TAKE ONE CAPSULE ONCE DAILY    rosuvastatin (CRESTOR) 20 MG tablet TAKE ONE TABLET BY MOUTH EVERY EVENING    semaglutide (OZEMPIC) 0.25 mg or 0.5 mg(2 mg/1.5 mL) pen injector Inject 0.25 mg into the skin every 7 days.    UNABLE TO FIND medication name: semaglutide  / b12 - .25 mg/per syringe. [facility].     No current facility-administered medications for this visit.     Current Outpatient Medications on File Prior to Visit   Medication Sig    aspirin (ECOTRIN) 81 MG EC tablet Take 81 mg by mouth once daily.    nebivoloL (BYSTOLIC) 5 MG Tab TAKE ONE TABLET BY MOUTH EVERY DAY    ramipriL (ALTACE) 2.5 MG capsule TAKE ONE CAPSULE ONCE DAILY    rosuvastatin (CRESTOR) 20 MG tablet TAKE ONE TABLET BY MOUTH EVERY EVENING    semaglutide (OZEMPIC) 0.25 mg or 0.5 mg(2 mg/1.5 mL) pen injector Inject 0.25 mg into the skin every 7 days.    UNABLE TO FIND medication name: semaglutide / b12 - .25 mg/per syringe. [facility] to [facility].     No current facility-administered medications on file prior to visit.     Review of patient's allergies indicates:  No Known Allergies   Review of Systems   Constitutional: Positive for decreased appetite and weight loss. Negative for diaphoresis and malaise/fatigue.   HENT:  Negative for hoarse voice.    Eyes:  Negative for double vision and visual disturbance.   Cardiovascular:  Negative for chest pain, claudication, cyanosis, dyspnea on exertion, irregular heartbeat, leg swelling, near-syncope, orthopnea, palpitations, paroxysmal nocturnal dyspnea and syncope.   Respiratory:  Negative for cough, hemoptysis, shortness of breath and snoring.    Hematologic/Lymphatic: Negative for bleeding problem. Does not bruise/bleed easily.   Skin:  Negative for color change and poor wound healing.   Musculoskeletal:  Negative for muscle cramps, muscle weakness and myalgias.   Gastrointestinal:  Negative for bloating, abdominal pain, change in bowel habit, diarrhea, heartburn, hematemesis, hematochezia, melena and nausea.   Neurological:  Negative for excessive daytime sleepiness, dizziness, headaches, light-headedness, loss of balance, numbness and weakness.   Psychiatric/Behavioral:  Negative for memory loss. The  "patient does not have insomnia.    Allergic/Immunologic: Negative for hives.       Objective:   Physical Exam  Vitals and nursing note reviewed.   Constitutional:       General: He is not in acute distress.     Appearance: He is well-developed. He is obese. He is not diaphoretic.   HENT:      Head: Normocephalic and atraumatic.   Eyes:      General:         Right eye: No discharge.         Left eye: No discharge.      Pupils: Pupils are equal, round, and reactive to light.   Neck:      Thyroid: No thyromegaly.      Vascular: No JVD.   Cardiovascular:      Rate and Rhythm: Normal rate and regular rhythm.      Pulses: Intact distal pulses.      Heart sounds: Normal heart sounds. No murmur heard.     No friction rub. No gallop.   Pulmonary:      Effort: Pulmonary effort is normal. No respiratory distress.      Breath sounds: Normal breath sounds. No wheezing or rales.   Chest:      Chest wall: No tenderness.   Abdominal:      General: Bowel sounds are normal. There is no distension.      Palpations: Abdomen is soft.      Tenderness: There is no abdominal tenderness.   Musculoskeletal:         General: Normal range of motion.      Cervical back: Neck supple.      Right lower leg: No edema.      Left lower leg: No edema.   Skin:     General: Skin is warm and dry.      Findings: No erythema or rash.   Neurological:      General: No focal deficit present.      Mental Status: He is alert and oriented to person, place, and time.      Cranial Nerves: No cranial nerve deficit.   Psychiatric:         Mood and Affect: Mood normal.         Behavior: Behavior normal.       Vitals:    03/20/25 1808   BP: 128/72   BP Location: Right arm   Patient Position: Sitting   Pulse: 64   Weight: 96.4 kg (212 lb 8.4 oz)   Height: 5' 6" (1.676 m)     Lab Results   Component Value Date    CHOL 100 (L) 03/13/2025    CHOL 109 (L) 08/01/2024    CHOL 117 07/03/2024      Body mass index is 34.3 kg/m².   No results found for: "LABA1C", "HGBA1C" " "  BMP  Lab Results   Component Value Date     03/13/2025    K 5.0 03/13/2025     03/13/2025    CO2 23 03/13/2025    BUN 20 03/13/2025    CREATININE 1.3 03/13/2025    CALCIUM 9.5 03/13/2025    ANIONGAP 10 03/13/2025    EGFRNORACEVR 59.8 (A) 03/13/2025      Lab Results   Component Value Date    HDL 39 (L) 03/13/2025    HDL 42 08/01/2024    HDL 40 07/03/2024     Lab Results   Component Value Date    LDLCALC 48.6 (L) 03/13/2025    LDLCALC 52.2 (L) 08/01/2024    LDLCALC 56 07/03/2024     Lab Results   Component Value Date    TRIG 62 03/13/2025    TRIG 74 08/01/2024    TRIG 113 07/03/2024     Lab Results   Component Value Date    CHOLHDL 39.0 03/13/2025    CHOLHDL 38.5 08/01/2024    CHOLHDL 38.7 01/24/2024       Chemistry        Component Value Date/Time     03/13/2025 0804    K 5.0 03/13/2025 0804     03/13/2025 0804    CO2 23 03/13/2025 0804    BUN 20 03/13/2025 0804    CREATININE 1.3 03/13/2025 0804    GLU 91 03/13/2025 0804        Component Value Date/Time    CALCIUM 9.5 03/13/2025 0804    ALKPHOS 70 03/13/2025 0804    AST 30 03/13/2025 0804    ALT 42 03/13/2025 0804    BILITOT 0.8 03/13/2025 0804    ESTGFRAFRICA >60.0 06/04/2021 0811    EGFRNONAA 64 12/02/2021 0737          Lab Results   Component Value Date    TSH 3.070 07/03/2024     No results found for: "INR", "PROTIME"  Lab Results   Component Value Date    WBC 7.6 07/03/2024    HGB 15.4 07/03/2024    HCT 49.3 07/03/2024    MCV 91 07/03/2024     07/03/2024     BMP  Sodium   Date Value Ref Range Status   03/13/2025 140 136 - 145 mmol/L Final     Potassium   Date Value Ref Range Status   03/13/2025 5.0 3.5 - 5.1 mmol/L Final     Chloride   Date Value Ref Range Status   03/13/2025 107 95 - 110 mmol/L Final     CO2   Date Value Ref Range Status   03/13/2025 23 23 - 29 mmol/L Final     BUN   Date Value Ref Range Status   03/13/2025 20 8 - 23 mg/dL Final     Creatinine   Date Value Ref Range Status   03/13/2025 1.3 0.5 - 1.4 mg/dL Final "     Calcium   Date Value Ref Range Status   03/13/2025 9.5 8.7 - 10.5 mg/dL Final     Anion Gap   Date Value Ref Range Status   03/13/2025 10 8 - 16 mmol/L Final     eGFR if    Date Value Ref Range Status   06/04/2021 >60.0 >60 mL/min/1.73 m^2 Final     eGFR if non    Date Value Ref Range Status   12/02/2021 64 >59 mL/min/1.73 Final     CrCl cannot be calculated (Patient's most recent lab result is older than the maximum 7 days allowed.).    Assessment:     1. Coronary artery disease involving native coronary artery of native heart without angina pectoris    2. Severe obesity (BMI 35.0-39.9) with comorbidity    3. Other hyperlipidemia    4. Primary hypertension    5. Abnormal stress test    6. Atherosclerosis of native coronary artery of native heart without angina pectoris    7. MIKE (obstructive sleep apnea)    8. Insulin resistance syndrome    9. Abnormal finding of blood chemistry, unspecified    Obesity on ozempic losing weight continue the same. Counseled about portions starches exercise.  Hlp on target continue the same    Cad good exercise tolerance asymptomatic continue same. Willr evaluate functional capacity with nuclear stress test.  Mike cpap compliant continue same.  Htn controlled continue same emphasized low salt diet     Discussed importance of exercise and portion control to promote weight loss mechanism of glp1   Plan:     Continue current therapy  Cardiac low salt diet.  Risk factor modification and excercise program.  F/u in 6 months with lipid cmp A1c cardiolite           [1]   Social History  Tobacco Use    Smoking status: Never    Smokeless tobacco: Never   Substance Use Topics    Alcohol use: Yes     Comment: occasional    Drug use: No

## 2025-03-21 ENCOUNTER — PATIENT MESSAGE (OUTPATIENT)
Dept: CARDIOLOGY | Facility: HOSPITAL | Age: 70
End: 2025-03-21
Payer: MEDICARE

## 2025-03-21 LAB
OHS QRS DURATION: 92 MS
OHS QTC CALCULATION: 408 MS

## 2025-04-10 DIAGNOSIS — I10 ESSENTIAL HYPERTENSION: ICD-10-CM

## 2025-04-10 RX ORDER — NEBIVOLOL 5 MG/1
5 TABLET ORAL
Qty: 90 TABLET | Refills: 3 | Status: SHIPPED | OUTPATIENT
Start: 2025-04-10